# Patient Record
Sex: FEMALE
[De-identification: names, ages, dates, MRNs, and addresses within clinical notes are randomized per-mention and may not be internally consistent; named-entity substitution may affect disease eponyms.]

---

## 2018-03-14 ENCOUNTER — HOSPITAL ENCOUNTER (OUTPATIENT)
Dept: HOSPITAL 14 - H.OPSURG | Age: 53
Discharge: HOME | End: 2018-03-14
Attending: ORTHOPAEDIC SURGERY
Payer: COMMERCIAL

## 2018-03-14 VITALS
OXYGEN SATURATION: 97 % | TEMPERATURE: 98.2 F | SYSTOLIC BLOOD PRESSURE: 128 MMHG | DIASTOLIC BLOOD PRESSURE: 70 MMHG | HEART RATE: 73 BPM

## 2018-03-14 VITALS — RESPIRATION RATE: 18 BRPM

## 2018-03-14 VITALS — BODY MASS INDEX: 43.7 KG/M2

## 2018-03-14 DIAGNOSIS — G47.33: ICD-10-CM

## 2018-03-14 DIAGNOSIS — E66.01: ICD-10-CM

## 2018-03-14 DIAGNOSIS — M16.11: Primary | ICD-10-CM

## 2018-03-14 PROCEDURE — 71046 X-RAY EXAM CHEST 2 VIEWS: CPT

## 2018-03-14 PROCEDURE — 76000 FLUOROSCOPY <1 HR PHYS/QHP: CPT

## 2018-03-14 PROCEDURE — 27275 MANIPULATION OF HIP JOINT: CPT

## 2018-03-14 PROCEDURE — 20610 DRAIN/INJ JOINT/BURSA W/O US: CPT

## 2018-03-14 PROCEDURE — 93005 ELECTROCARDIOGRAM TRACING: CPT

## 2018-03-14 RX ADMIN — HYDROMORPHONE HYDROCHLORIDE PRN MG: 1 INJECTION, SOLUTION INTRAMUSCULAR; INTRAVENOUS; SUBCUTANEOUS at 13:35

## 2018-03-14 RX ADMIN — HYDROMORPHONE HYDROCHLORIDE PRN MG: 1 INJECTION, SOLUTION INTRAMUSCULAR; INTRAVENOUS; SUBCUTANEOUS at 14:00

## 2018-03-14 RX ADMIN — HYDROMORPHONE HYDROCHLORIDE PRN MG: 1 INJECTION, SOLUTION INTRAMUSCULAR; INTRAVENOUS; SUBCUTANEOUS at 13:45

## 2018-03-14 RX ADMIN — HYDROMORPHONE HYDROCHLORIDE PRN MG: 1 INJECTION, SOLUTION INTRAMUSCULAR; INTRAVENOUS; SUBCUTANEOUS at 14:15

## 2018-03-14 NOTE — RAD
HISTORY:

preop  



COMPARISON:

No prior.



TECHNIQUE:

Chest PA and lateral



FINDINGS:



LUNGS:

Minor bibasilar atelectasis



PLEURA:

No significant pleural effusion identified. No pneumothorax apparent.



CARDIOVASCULAR:

Normal.



OSSEOUS STRUCTURES:

Minor multilevel degenerative spondylosis of the thoracic spine



VISUALIZED UPPER ABDOMEN:

Normal.



OTHER FINDINGS:

None.



IMPRESSION:

Minor bibasilar atelectasis

## 2018-03-14 NOTE — RAD
PROCEDURE:  Intraoperative Fluoroscopy. 



HISTORY:

RT. HIP INJECTION



FINDINGS:

Fluoroscopic assistance was provided. 34.9 seconds fluoroscopy time 

utilized during this procedure.  Radiation dose = 5.68 mGy.



Please refer to the operative report from [facility].

## 2018-03-14 NOTE — CARD
--------------- APPROVED REPORT --------------





EKG Measurement

Heart [id number]

[id number]

[id number]

[id number]

[id number]



<Conclusion>

Normal sinus rhythm

Normal ECG

## 2018-03-14 NOTE — PCM.SURG1
Surgeon's Initial Post Op Note





- Surgeon's Notes


Surgeon: Jayshree


Assistant: MISHEL Orantes


Type of Anesthesia: General Endo


Anesthesia Administered By: Dr SILVA Vasquez


Pre-Operative Diagnosis: Severe hypertrophic O/A R hip.  Morbid obesity


Operative Findings: hypertrophic O/A R Hip


Post-Operative Diagnosis: as above.  morbid obesity


Operation Performed: Arthrogram r Hip.  intraraticular injection R hip.  

positioning of fluor/interpretation of video


Specimen/Specimens Removed: synvial fluid


Estimated Blood Loss: EBL {In ML}: 3


Blood Products Given: N/A


Drains Used: No Drains


Post-Op Condition: Good


Date of Surgery/Procedure: 03/14/18


Time of Surgery/Procedure: 13:10 (time in room/anaesthesia indcution time 12:28)

## 2018-03-14 NOTE — CP.SDSHP
Same Day Surgery H & P





- History


Proposed Procedure: Right hip intra-articular steroid injection using 

flouroscopy under anesthesia


Pre-Op Diagnosis: Right hip osteoarthritis





- Previous Medical/Surgical History


Misc: Other (Lumbar HNP and spinal stenosis, Morbid obesity)


Pain: 8.Very Severe


Previous Surgical History: ectopic pregnancy





- Allergies


Allergies: 


Allergies





No Known Allergies Allergy (Verified 03/14/18 08:21)


 











- Current Medications


Current Medications: 


see med rec








- Physical Exam


General Appearance: No acute distress


Vital Signs: 


 Vital Signs











  03/14/18 03/14/18





  09:00 09:29


 


Temperature 97.6 F 


 


Pulse Rate 90 90


 


Respiratory 18 





Rate  


 


Blood Pressure 153/72 H 


 


O2 Sat by Pulse 97 





Oximetry  











Neuro: WNL


Heart: WNL


Lungs: WNL


GI: WNL





- {Optional Preform as Required}


Abdomen: WNL


Integument: WNL


Ortho: Other (RLE: Pain with ROM of hip, gross NVI distally, motor 5/5)





- Impression


Impression: Patient is a 53 y/o female with severe right hip pain which has 

failed conservative management with NSAID's and PT over the past 3 years.  The 

pain has progressively worsened over the past few months hindering her ADL's, 

especially walking.   The patient is mobidly obese.  The decision was made to 

proceed with an intra-articular steriod injection of the hip for management.


Pt. Evaluated Today:Candidate for Anesthesia & Procedure: Yes (Risk/Varun 

discussed with pt in detail, she agrees to proceed)





- Date & Time


Date: 03/14/18


Time: 12:00





Short Stay Discharge





- Short Stay Discharge


Admitting Diagnosis/Reason for Visit: M25.551


Disposition: HOME/ ROUTINE


Referrals: 


Kingsley Martell III, MD [Primary Care Provider] -

## 2018-03-15 NOTE — OP
PROCEDURE DATE:  03/14/2018



LOCATION:  [facility].



PREOPERATIVE DIAGNOSES:

1.  Severe hypertrophic osteoarthritis of the right hip.

2.  Morbid obesity.



OPERATIVE FINDINGS:

Hypertrophic osteoarthritis of the right hip with flexion contracture and

morbid obesity.



POSTOPERATIVE DIAGNOSES:

1.  Severe hypertrophic osteoarthritis of the right hip.

2.  Morbid obesity.



SURGEON:  Kingsley Martell MD



FIRST ASSISTANT:  Effie Upton, certified registered nursing first

assistant.  Completion of the operative goal could not have been achieved

without the assistance of Effie Upton, certified registered nursing first

assistant.



ANESTHESIA:  General tracheal anesthesia.



OPERATION PERFORMED:

1.  Arthrogram right hip.

2.  Placement of the arthrography needle.

3.  Intra-articular injection of the hip.

4.  Manipulation of the right hip under anesthesia.

5.  Positioning of fluoroscope interpretation of video images.



SPECIMENS REMOVED:  Synovial fluid.



BLOOD LOSS:  Approximately 3 mL.



BLOOD PRODUCTS GIVEN:  None.



DRAINS:  No drains.



POSTOPERATIVE CONDITION:  Stable.



TIME IN THE ROOM:  12:28.



INCISION TIME:  1310 hours.



OPERATIVE INDICATIONS:  Elizabeth Butts is a 52-year-old woman who is

morbidly obese who presents with severe pain and restricted range of motion

of the hip.  The patient can no longer withstand the discomfort, total hip

replacement is imminent, but a conservative approach consisting of weight

loss and intra-articular injection first is discussed.



PROCEDURE IN DETAIL:  After having obtained informed consent, after the

satisfactory induction of general endotracheal anesthesia by Dr. Harmon, after having identified side, site, and procedure and critical

pause/time-out, the right lower extremity was prepped and free draped in

usual fashion for the intra-articular injection and arthrogram of the right

hip.  Under the surgeon's direction, the fluoroscope was positioned, video

images were generated, therapeutic decisions were made therefrom.  The

femoral artery was palpated and marked with indelible marker.  The pannus

was reflected out of the way.  The anterior superior iliac spine was

identified, and under the surgeon's direction, the fluoroscope was

positioned.  The hip joint was localized with a radiopaque instrument.  The

long spinal needle was introduced and is positioned both on AP and frog

lateral views.  Radiopaque contrast was introduced into the hip joint. 

Arthrogram having been accomplished, aspiration was accomplished.  At this

point in time, the position of the needle was found to be acceptable and a

solution of Depo-Medrol, Duramorph, and Marcaine was introduced.  Again the

hip was manipulated under anesthesia.  There was found to be evidence of a

flexion contracture with marked arthritic change and restricted motion in

essentially all planes.  Compression dressing was applied.  The patient was

transferred from the operating table to the stretcher having tolerated the

procedure well.





__________________________________________

Kingsley Martell MD





DD:  03/15/2018 15:30:51

DT:  03/15/2018 15:34:10

Job # [id number]

## 2018-05-02 ENCOUNTER — HOSPITAL ENCOUNTER (INPATIENT)
Dept: HOSPITAL 14 - H.OPSURG | Age: 53
LOS: 2 days | Discharge: NURSING, REHAB OR LONG-TERM CARE | DRG: 470 | End: 2018-05-04
Attending: ORTHOPAEDIC SURGERY | Admitting: INTERNAL MEDICINE
Payer: COMMERCIAL

## 2018-05-02 VITALS — BODY MASS INDEX: 42.9 KG/M2

## 2018-05-02 DIAGNOSIS — E66.01: ICD-10-CM

## 2018-05-02 DIAGNOSIS — N39.0: ICD-10-CM

## 2018-05-02 DIAGNOSIS — Z87.891: ICD-10-CM

## 2018-05-02 DIAGNOSIS — Z23: ICD-10-CM

## 2018-05-02 DIAGNOSIS — D62: ICD-10-CM

## 2018-05-02 DIAGNOSIS — M16.11: Primary | ICD-10-CM

## 2018-05-02 LAB
BASOPHILS # BLD AUTO: 0.1 K/UL (ref 0–0.2)
BASOPHILS NFR BLD: 1 % (ref 0–2)
EOSINOPHIL # BLD AUTO: 0.1 K/UL (ref 0–0.7)
EOSINOPHIL NFR BLD: 2.1 % (ref 0–4)
ERYTHROCYTE [DISTWIDTH] IN BLOOD BY AUTOMATED COUNT: 12.6 % (ref 11.5–14.5)
HGB BLD-MCNC: 14.6 G/DL (ref 12–16)
LYMPHOCYTES # BLD AUTO: 1.8 K/UL (ref 1–4.3)
LYMPHOCYTES NFR BLD AUTO: 29.7 % (ref 20–40)
MCH RBC QN AUTO: 32.5 PG (ref 27–31)
MCHC RBC AUTO-ENTMCNC: 34.1 G/DL (ref 33–37)
MCV RBC AUTO: 95.5 FL (ref 81–99)
MONOCYTES # BLD: 0.8 K/UL (ref 0–0.8)
MONOCYTES NFR BLD: 13.2 % (ref 0–10)
NEUTROPHILS # BLD: 3.2 K/UL (ref 1.8–7)
NEUTROPHILS NFR BLD AUTO: 54 % (ref 50–75)
NRBC BLD AUTO-RTO: 0 % (ref 0–0)
PLATELET # BLD: 259 K/UL (ref 130–400)
PMV BLD AUTO: 9 FL (ref 7.2–11.7)
RBC # BLD AUTO: 4.5 MIL/UL (ref 3.8–5.2)
WBC # BLD AUTO: 6 K/UL (ref 4.8–10.8)

## 2018-05-02 PROCEDURE — 3E0T33Z INTRODUCTION OF ANTI-INFLAMMATORY INTO PERIPHERAL NERVES AND PLEXI, PERCUTANEOUS APPROACH: ICD-10-PCS

## 2018-05-02 PROCEDURE — 0SR90JZ REPLACEMENT OF RIGHT HIP JOINT WITH SYNTHETIC SUBSTITUTE, OPEN APPROACH: ICD-10-PCS | Performed by: ORTHOPAEDIC SURGERY

## 2018-05-02 PROCEDURE — 3E0T3BZ INTRODUCTION OF ANESTHETIC AGENT INTO PERIPHERAL NERVES AND PLEXI, PERCUTANEOUS APPROACH: ICD-10-PCS | Performed by: ORTHOPAEDIC SURGERY

## 2018-05-02 RX ADMIN — HYDROMORPHONE HYDROCHLORIDE PRN MG: 1 INJECTION, SOLUTION INTRAMUSCULAR; INTRAVENOUS; SUBCUTANEOUS at 15:20

## 2018-05-02 RX ADMIN — OXYCODONE HYDROCHLORIDE AND ACETAMINOPHEN PRN TAB: 5; 325 TABLET ORAL at 18:55

## 2018-05-02 RX ADMIN — HYDROMORPHONE HYDROCHLORIDE PRN MG: 1 INJECTION, SOLUTION INTRAMUSCULAR; INTRAVENOUS; SUBCUTANEOUS at 15:05

## 2018-05-02 RX ADMIN — HYDROMORPHONE HYDROCHLORIDE PRN MG: 1 INJECTION, SOLUTION INTRAMUSCULAR; INTRAVENOUS; SUBCUTANEOUS at 14:35

## 2018-05-02 RX ADMIN — OXYCODONE HYDROCHLORIDE AND ACETAMINOPHEN PRN TAB: 5; 325 TABLET ORAL at 22:21

## 2018-05-02 RX ADMIN — HYDROMORPHONE HYDROCHLORIDE PRN MG: 1 INJECTION, SOLUTION INTRAMUSCULAR; INTRAVENOUS; SUBCUTANEOUS at 14:50

## 2018-05-02 NOTE — CP.PCM.HP
History of Present Illness





- History of Present Illness


History of Present Illness: 











Chief Complaint: Right Hip Pain





HPI:








53 y/o lady , no significant PMH except for Degenerative Joint and Disc Dis , 

came in for scheduled Right THR.  The patient states that for the past 3 years 

, she has been having right hip pain w/c progressively  worsened.  For the Past 

1 year , she has been experiencing severe pain requiring almost daily intake of 

analgesic - Ultram.  She went to see Dr Hernandez and imagings done showed Severe 

OA .  She had Intra-articular injections and Physical therapy however despite 

these her pain continued.  She was then advised surgery.











Present on Admission





- Present on Admission


Any Indicators Present on Admission: No





Review of Systems





- Review of Systems


All systems: reviewed and no additional remarkable complaints except





- Constitutional


Constitutional: absent: Chills, Fever





- EENT


Eyes: absent: Change in Vision


Ears: absent: Decreased Hearing, Ear Discharge


Nose/Mouth/Throat: absent: Epistaxis, Nasal Congestion, Nasal Discharge





- Cardiovascular


Cardiovascular: absent: Chest Pain, Claudication, Pain Radiating to Arm/Neck/Jaw

, Lightheadedness, Orthopnea, Palpitations, Pedal Edema





- Respiratory


Respiratory: absent: Cough, Dyspnea, Hemoptysis, Dyspnea on Exertion





- Gastrointestinal


Gastrointestinal: absent: Abdominal Pain, Nausea, Vomiting





- Genitourinary


Genitourinary: absent: Dysuria, Hematuria, Pyuria, Nocturia





- Musculoskeletal


Musculoskeletal: Arthralgias, Limited Range of Motion (right hip).  absent: 

Back Pain





- Integumentary


Integumentary: absent: Pruritus, Rash, Skin Ulcer, Sores





- Neurological


Neurological: absent: Dizziness, Focal Weakness, Headaches, Loss of Vision, 

Paresthesias





- Endocrine


Endocrine: absent: Polydipsia, Polyphagia, Polyuria





- Hematologic/Lymphatic


Hematologic: absent: Easy Bleeding, Easy Bruising





Past Patient History





- Infectious Disease


Hx of Infectious Diseases: None





- Tetanus Immunizations


Tetanus Immunization: Unknown





- Past Medical History & Family History


Past Medical History?: Yes


Past Family History: Reviewed and not pertinent





- Past Social History


Smoking Status: Former Smoker


Chewing Tobacco Use: No


Cigar Use: No


Occupation: works for a [employer] company


Alcohol: None


Home Situation {Lives}: With Family





- CARDIAC


Hx Cardiac Disorders: No





- PULMONARY


Hx Respiratory Disorders: No





- NEUROLOGICAL


Hx Neurological Disorder: No





- HEENT


Hx HEENT Problems: No





- RENAL


Hx Chronic Kidney Disease: No





- ENDOCRINE/METABOLIC


Hx Endocrine Disorders: No





- HEMATOLOGICAL/ONCOLOGICAL


Hx Blood Disorders: No


Hx Blood Transfusion Reaction: No





- INTEGUMENTARY


Hx Dermatological Problems: No





- MUSCULOSKELETAL/RHEUMATOLOGICAL


Hx Musculoskeletal Disorders: No


Hx Arthritis: Yes


Hx Degenerative Joint Disease: Yes


Hx Herniated Disk: Yes (4)


Hx Osteoarthritis: Yes


Hx Spinal Stenosis: Yes





- GASTROINTESTINAL


Hx Gastrointestinal Disorders: Yes


Hx Gastritis: Yes





- GENITOURINARY/GYNECOLOGICAL


Hx Genitourinary Disorders: No





- PSYCHIATRIC


Hx Psychophysiologic Disorder: No





- SURGICAL HISTORY


Hx Surgeries: Yes


Other/Comment: epidural,ectopic pregnancy





- ANESTHESIA


Hx Anesthesia: Yes


Hx Anesthesia Reactions: No


Hx Malignant Hyperthermia: No


Has any member of the family had a problem w/ anesthesia?: No





Meds


Allergies/Adverse Reactions: 


 Allergies











Allergy/AdvReac Type Severity Reaction Status Date / Time


 


No Known Allergies Allergy   Verified 05/02/18 06:44














Physical Exam





- Constitutional


Appears: Well, Non-toxic, No Acute Distress





- Head Exam


Head Exam: ATRAUMATIC, NORMAL INSPECTION, NORMOCEPHALIC





- Eye Exam


Eye Exam: EOMI, Normal appearance, PERRL


Pupil Exam: NORMAL ACCOMODATION





- ENT Exam


ENT Exam: Mucous Membranes Moist, Normal External Ear Exam





- Neck Exam


Neck exam: Positive for: Full Rom.  Negative for: Meningismus





- Respiratory Exam


Respiratory Exam: NORMAL BREATHING PATTERN.  absent: Rales, Wheezes, 

Respiratory Distress





- Cardiovascular Exam


Cardiovascular Exam: REGULAR RHYTHM, +S1, +S2





- GI/Abdominal Exam


GI & Abdominal Exam: Normal Bowel Sounds, Soft.  absent: Tenderness





- Extremities Exam


Extremities exam: Positive for: normal capillary refill, pedal pulses present.  

Negative for: calf tenderness


Additional comments: 





Pain on ROM of the right Hip





- Back Exam


Back exam: FULL ROM.  absent: CVA tenderness (L), CVA tenderness (R)





- Neurological Exam


Neurological exam: Alert, CN II-XII Intact, Oriented x3, Reflexes Normal





- Psychiatric Exam


Psychiatric exam: Normal Affect, Normal Mood





- Skin


Skin Exam: Dry, Intact, Normal Color, Warm





Results





- Vital Signs


Recent Vital Signs: 





 Last Vital Signs











Temp  98 F   05/02/18 07:00


 


Pulse  87   05/02/18 07:00


 


Resp      


 


BP  137/79   05/02/18 07:00


 


Pulse Ox  95   05/02/18 07:00














- Labs


Result Diagrams: 


 05/02/18 06:45





Labs: 





 Laboratory Results - last 24 hr











  05/02/18





  06:45


 


WBC  6.0


 


RBC  4.50


 


Hgb  14.6


 


Hct  42.9


 


MCV  95.5


 


MCH  32.5 H


 


MCHC  34.1


 


RDW  12.6


 


Plt Count  259


 


MPV  9.0


 


Neut % (Auto)  54.0


 


Lymph % (Auto)  29.7


 


Mono % (Auto)  13.2 H


 


Eos % (Auto)  2.1


 


Baso % (Auto)  1.0


 


Neut # (Auto)  3.2


 


Lymph # (Auto)  1.8


 


Mono # (Auto)  0.8


 


Eos # (Auto)  0.1


 


Baso # (Auto)  0.1














Reviewed labs done as outpt





- EKG Data


EKG Interpreted by: Myself


EKG shows normal: Sinus rhythm


Rate: Normal





Assessment & Plan


(1) Primary osteoarthritis of right hip


Status: Acute   





(2) Morbid obesity with BMI of 40.0-44.9, adult


Status: Chronic   





(3) UTI (urinary tract infection)


Status: Resolved   





(4) DVT prophylaxis


Status: Acute   





- Assessment and Plan (Free Text)


Assessment: 











53 y/o lady with hx of Degen Joint and Disc Disease , came in for scheduled 

Right THR.  Pt has Severe Primary OA of the right hip , and failed outpatient 

conservative treatment for OA.





(1) Primary osteoarthritis of right hip


Status: Acute   


Severe Right hip pain , requiring daily pain meds, pain on ROM, pain on 

ambulating worseing this past year.


Plan for Right THR


Ortho: DR Martell


Pt medically optimize for the surgery as outpt, low cardiac risk


Pain mgt


DVT proph post op


PT/OT consults








(2) Morbid obesity with BMI of 40.0-44.9, adult


Status: Chronic   





(3) UTI (urinary tract infection), treated


Status: Resolved   


pt just completed 1 wk for antibiotic treatment ( cipro) for UTI








(4) DVT prophylaxis


Status: Acute   


start Lovenox post op





Decision To Admit





- Pt Status Changed To:


Hospital Disposition Of: Inpatient





- Admit Certification


Admit to Inpatient:: After my assessment, the patient will require 

hospitalization for at least two midnights.  This is because of the severity of 

symptoms shown, intensity of services needed, and/or the medical risk in this 

patient being treated as an outpatient.





- .


Bed Request Type: Med/Surg


Admitting Physician: Flor Whitehead

## 2018-05-02 NOTE — PCM.ANESB3
Femoral Nerve Block





- Femoral Nerve Block


Date of Procedure: 05/02/18


Anesthesiologist: Faustino


Pre-Procedure Diagnosis: Right hip advanced arthritis


Post-Procedure Diagnosis: Same


Procedure Performed: Femoral Nerve Block Right





- Procedure


Femoral Nerve Block: 


The procedure was explained to the patient that it is for the post-operative 

pain management. Consent was obtained after a thorough discussion with the 

patient regarding the benefits and possible complications of local anesthetic 

block of the femoral nerve at the inguinal crease area. The patient was brought 

to the operating room and standard monitors were applied. Time-out was held 

with the circulating nurse to confirm the correct surgery and the appropriate 

block. Under general anesthesia, patient was placed in supine position with 

fully extended lower extremities and the __right_____ groin exposed. The 

femoral artery was then carefully palpated. The ultrasound transducer was then 

applied to this area in the transverse plane and the femoral nerve was 

visualized lateral to the femoral artery and underneath the fascia iliaca. 

After thorough identification, the inguinal crease area was prepped with 

Chloraprep





At this point, a #22 gauge Stimuplex 4-inch needle was inserted immediately 

lateral to the femoral artery pulse at the inguinal crease and advanced 

perpendicularly. The needle was inserted to the ultrasound transducer in-plane 

towards the femoral nerve in a lateral-to-medial direction. Needle advancement 

was performed carefully under direct ultrasound visualization. Nerve stimulator 

was used and twitch of the quadriceps muscle was obtained at current of __0.4___

MA. After negative aspiration, ___2__cc of __0.25___% ___bupivicaine____________

_____was  injected and this was followed with __38____ cc of ___0.25____ % ____

bupivicaine__________. Under ultrasound guidance the local anesthetics were 

observed spreading below fascia iliaca around the femoral nerve and laterally 

towards the lateral femoral cutaneous nerve. The needle was removed intact and 

sterile dressing was applied. 





The patient tolerated the femoral nerve block well with stable vital signs and 

was prepared for emergence.

## 2018-05-02 NOTE — PCM.SURG1
Surgeon's Initial Post Op Note





- Surgeon's Notes


Surgeon: Jayshree


Assistant: REMA Rawls CRNFA


Type of Anesthesia: General Endo, Spinal


Anesthesia Administered By: Dr Vasquez


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


Operative Findings: Severe DJD R hip.  contracture iliopsoas tendon.  synovitis 

R hip


Post-Operative Diagnosis: as above


Operation Performed: R THR- anterior approach.  femoral neck osteotomy.  

release iliopsoas.  autograft/allograft bone graft.  computer navigation


Specimen/Specimens Removed: bone,tendon,cartilage


Estimated Blood Loss: EBL {In ML}: 300


Blood Products Given: N/A


Drains Used: No Drains


Post-Op Condition: Good


Date of Surgery/Procedure: 05/02/18


Time of Surgery/Procedure: 09:05 (time in room 7:50/anesthesia induction time 7:

50)

## 2018-05-02 NOTE — CP.PCM.CON
History of Present Illness





- History of Present Illness


History of Present Illness: 





Orthopedic consultation Dr. Martell





52F with right hip DJD failed conservative mgmt and elected for THR. PMH: GERD  

NKDA


No hx stents/bleeding or clotting disorder/DVT/seizure disorder





Review of Systems





- Review of Systems


All systems: reviewed and no additional remarkable complaints except





- Musculoskeletal


Musculoskeletal: As Per HPI (no recent illness)





Past Patient History





- Past Medical History & Family History


Past Medical History?: Yes





- Past Social History


Smoking Status: Never Smoked





- CARDIAC


Hx Cardiac Disorders: No





- PULMONARY


Hx Respiratory Disorders: No





- NEUROLOGICAL


Hx Neurological Disorder: No





- HEENT


Hx HEENT Problems: No





- RENAL


Hx Chronic Kidney Disease: No





- ENDOCRINE/METABOLIC


Hx Endocrine Disorders: No





- HEMATOLOGICAL/ONCOLOGICAL


Hx Blood Disorders: No


Hx Blood Transfusion Reaction: No





- INTEGUMENTARY


Hx Dermatological Problems: No





- MUSCULOSKELETAL/RHEUMATOLOGICAL


Hx Musculoskeletal Disorders: No


Hx Herniated Disk: Yes (4)


Hx Spinal Stenosis: Yes





- GASTROINTESTINAL


Hx Gastrointestinal Disorders: Yes


Hx Gastritis: Yes





- GENITOURINARY/GYNECOLOGICAL


Hx Genitourinary Disorders: No





- PSYCHIATRIC


Hx Psychophysiologic Disorder: No





- SURGICAL HISTORY


Hx Surgeries: Yes


Other/Comment: epidural,ectopic pregnancy





- ANESTHESIA


Hx Anesthesia: Yes


Hx Anesthesia Reactions: No


Hx Malignant Hyperthermia: No


Has any member of the family had a problem w/ anesthesia?: No





Meds


Allergies/Adverse Reactions: 


 Allergies











Allergy/AdvReac Type Severity Reaction Status Date / Time


 


No Known Allergies Allergy   Verified 05/02/18 06:44














- Medications


Medications: 


 Current Medications





Tranexamic Acid 1,000 mg/ (Sodium Chloride)  100 mls @ 10 mls/min IVPB STAT STA


   Stop: 05/02/18 07:14











Physical Exam





- Constitutional


Appears: Well, No Acute Distress





- Respiratory Exam


Respiratory Exam: NORMAL BREATHING PATTERN





- Extremities Exam


Additional comments: 





calves soft NT neg homans


sensation itnact+DP/PT pulses





- Expanded Lower Extremities Exam


  ** Right


Ankle exam: FULL ROM, NORMAL INSPECTION


Neuro vacular tendon exam: no vascular compromise





- Neurological Exam


Neurological exam: Alert, Oriented x3





- Psychiatric Exam


Psychiatric exam: Normal Affect, Normal Mood





- Skin


Skin Exam: Dry, Intact, Normal Color, Warm





Results





- Vital Signs


Recent Vital Signs: 


 Last Vital Signs











Temp  98 F   05/02/18 07:00


 


Pulse  87   05/02/18 07:00


 


Resp      


 


BP  137/79   05/02/18 07:00


 


Pulse Ox  95   05/02/18 07:00














- Labs


Result Diagrams: 


 05/02/18 06:45





Labs: 


 Laboratory Results - last 24 hr











  05/02/18 05/02/18





  06:45 06:45


 


WBC  6.0 


 


RBC  4.50 


 


Hgb  14.6 


 


Hct  42.9 


 


MCV  95.5 


 


MCH  32.5 H 


 


MCHC  34.1 


 


RDW  12.6 


 


Plt Count  259 


 


MPV  9.0 


 


Neut % (Auto)  54.0 


 


Lymph % (Auto)  29.7 


 


Mono % (Auto)  13.2 H 


 


Eos % (Auto)  2.1 


 


Baso % (Auto)  1.0 


 


Neut # (Auto)  3.2 


 


Lymph # (Auto)  1.8 


 


Mono # (Auto)  0.8 


 


Eos # (Auto)  0.1 


 


Baso # (Auto)  0.1 


 


Crossmatch   See Detail


 


BBK History Checked   No verified bt














Assessment & Plan


(1) Primary osteoarthritis of right hip


Assessment and Plan: 


NPO


T&S


risks/benefits/alt of THR explained to patient who verbalized understanding and 

consented to procedure


d/w Dr. Martell, agrees with above


Status: Acute   





(2) Morbid obesity with BMI of 40.0-44.9, adult


Status: Chronic

## 2018-05-02 NOTE — RAD
PROCEDURE:  Right Hip Radiographs.



AP portable views of the pelvis and right hip performed. Study is 

somewhat limited due to portable technique 



HISTORY:

Status post right BETTY  



COMPARISON:

None.



FINDINGS:



BONES:

Patient is status post right total hip arthroplasty. Right femoral 

component appears properly located within the acetabular component. 



JOINTS:

As above. 



SOFT TISSUES:

Normal. 



OTHER FINDINGS:

None.



IMPRESSION:

Limited portable views of the pelvis and hips demonstrate right-sided 

total hip replacement. .  The femoral head component appears 

appropriately located within the acetabular component

## 2018-05-02 NOTE — RAD
PROCEDURE:  Intraoperative Fluoroscopy. 



HISTORY:

TOTAL HIP



FINDINGS:

Fluoroscopic assistance was provided.Radiation dose = 9.78 mGy during 

51 seconds fluoroscopy time.  



Please refer to the operative report from [facility].

## 2018-05-03 LAB
BUN SERPL-MCNC: 17 MG/DL (ref 7–17)
CALCIUM SERPL-MCNC: 8.4 MG/DL (ref 8.4–10.2)
ERYTHROCYTE [DISTWIDTH] IN BLOOD BY AUTOMATED COUNT: 12.8 % (ref 11.5–14.5)
GFR NON-AFRICAN AMERICAN: > 60
HGB BLD-MCNC: 9.8 G/DL (ref 12–16)
MCH RBC QN AUTO: 32.8 PG (ref 27–31)
MCHC RBC AUTO-ENTMCNC: 33.8 G/DL (ref 33–37)
MCV RBC AUTO: 97.2 FL (ref 81–99)
PLATELET # BLD: 195 K/UL (ref 130–400)
RBC # BLD AUTO: 2.97 MIL/UL (ref 3.8–5.2)
WBC # BLD AUTO: 9.8 K/UL (ref 4.8–10.8)

## 2018-05-03 PROCEDURE — 3E0234Z INTRODUCTION OF SERUM, TOXOID AND VACCINE INTO MUSCLE, PERCUTANEOUS APPROACH: ICD-10-PCS | Performed by: INTERNAL MEDICINE

## 2018-05-03 RX ADMIN — OXYCODONE HYDROCHLORIDE SCH MG: 10 TABLET, FILM COATED, EXTENDED RELEASE ORAL at 09:32

## 2018-05-03 RX ADMIN — OXYCODONE HYDROCHLORIDE AND ACETAMINOPHEN PRN TAB: 5; 325 TABLET ORAL at 02:42

## 2018-05-03 RX ADMIN — POLYVINYL ALCOHOL PRN DROP: 14 SOLUTION/ DROPS OPHTHALMIC at 11:20

## 2018-05-03 RX ADMIN — OXYCODONE HYDROCHLORIDE PRN MG: 10 TABLET ORAL at 11:42

## 2018-05-03 RX ADMIN — OXYCODONE HYDROCHLORIDE AND ACETAMINOPHEN PRN TAB: 5; 325 TABLET ORAL at 06:56

## 2018-05-03 RX ADMIN — OXYCODONE HYDROCHLORIDE SCH MG: 10 TABLET, FILM COATED, EXTENDED RELEASE ORAL at 20:53

## 2018-05-03 RX ADMIN — POLYVINYL ALCOHOL PRN DROP: 14 SOLUTION/ DROPS OPHTHALMIC at 20:56

## 2018-05-03 RX ADMIN — OXYCODONE HYDROCHLORIDE PRN MG: 10 TABLET ORAL at 16:36

## 2018-05-03 RX ADMIN — ENOXAPARIN SODIUM SCH MG: 40 INJECTION SUBCUTANEOUS at 09:31

## 2018-05-03 NOTE — OP
PROCEDURE DATE:  05/02/2018



PREOPERATIVE DIAGNOSES:  Severe osteoarthritis of the right hip with

acetabular deformity.



POSTOPERATIVE DIAGNOSES:  Severe osteoarthritis of the right hip with

acetabular deformity.



PROCEDURE PERFORMED:

1.  Right total hip replacement arthroplasty.

2.  Femoral neck osteotomy.

3.  Release of iliopsoas tendon.

4.  Autograft, allograft and bone graft to the acetabulum.

5.  Computer navigation.



SURGEON:  Kingsley Martell MD



FIRST ASSISTANT:  Effie Upton, certified registered nursing first

assistant.



SECOND ASSISTANT:  Bird Zamorano PA-C.



SPECIMENS REMOVED:  Bone tendon and cartilage.



ESTIMATED BLOOD LOSS:  Approximately 300 mL.



BLOOD PRODUCTS:  No blood products given.



DRAINS:  One ISAI drain.



POSTOPERATIVE CONDITION:  Good.



DATE OF SURGERY:  05/02/2018



TIME SEEN:  Time in the room 7:50 and anesthesia incision time 9:05.



OPERATIVE INDICATION:  Elizabeth Butts is a 52-year-old woman who has severe

pain and restricted range of motion of the right hip.  The patient has

failed intra-articular injection.  The patient now presents for definitive

replacement arthroplasty.  Pros, cons, risks and benefits of anterior and

posterior approach of hip replacement are discussed.  The patient's BMI is

42, she is on the baseline, but she and her , Juan are very

interested in the approach anteriorly and we will accomplish the same. 

Possibility of mechanical failure, infection, component malposition,

thromboembolic disease, nerve injury, leg-length inequality, secondary or

tertiary surgery was discussed.  It should be noted that the patient had

initially a preoperative leg-length inequality, right worse than left to

approximately 1 inch right shorter than the left.  Again the possibility of

mechanical failure, infection, thromboembolic disease, secondary or

tertiary surgery had been discussed.



OPERATIVE PROCEDURE:  After having obtained informed consent, after the

satisfactory induction of spinal and general anesthesia by Dr. Harmon,

after having identified the side, site and procedure and a critical

pause/time-out, the patient identified as Elizabeth Butts in the supine

position.  The patient was placed in the AMIS traction.  The pannus was

reflected, great care was taken and all bony prominences were well padded. 

After the satisfactory induction of the spinal and general anesthesia and

after having obtained informed consent under the surgeon's direction, the

fluoroscope was positioned, video images were generated, therapeutic

decisions were made therefrom.



Again after having positioned the patient in the AMIS traction positioner

with all bony prominences well padded and under the surgeon's direction,

the fluoroscope was positioned, video images were generated, and

therapeutic decisions were made therefrom.  This having been accomplished,

verification of position was offered on image intensification views.  This

having been accomplished, after sterilely prepping and draping, an incision

was described one fingerbreadth 1 cm distal to the ASIS and 3 cm

posteriorly.  The incision was described superficial to the tensor fascia

femoris muscle.  The skin incision was carried down through the skin and

subcutaneous tissue.  The fascia on the tensor fascia femoris muscle was

divided.  The tensor was taken down from the investing fascia.  The Medacta

modified, Adson-Marshal retractor was placed and the posterior aspect of

the rectus femoris was identified.  Hemostasis was controlled with the

Aquamantys.  This having been accomplished, deep to the posterior aspect of

the rectus was developed and the Adson-Marshal was placed horizontal into

superficial to the hip joint and the fascia was divided.  At this point in

time, the patient was very well muscled side from her very high 44 BMI. 

With internal rotation of the hip, the fat pads superficial to the rectus

femoris was excised and the rectus femoris was released and that having

been accomplished, the capsulotomy was accomplished extending laterally

with the hip in internal rotation.  The Cobra retractor was placed and the

capsulotomy begins, it was carried distal to the area of the

intertrochanteric tubercle, this was elevated and the capsular flap was

tagged.  Hemostasis was controlled.  This having been accomplished with

rotation, the femoral neck osteotomy was accomplished at the saddle and

because of the leg length inequality, the cut was very well planned.  The

femoral neck osteotomy was critical in this case and extra planning was

accomplished both preoperatively and intraoperatively.  Computer navigation

was at this point in time employed.  This having been accomplished,

computer navigation with accelerometer technology was employed.  Femoral

neck osteotomy was accomplished.  Femoral neck osteotomy was completed and

at this point in time, the corkscrew was placed into the neck with 45

degrees of external rotation of the femur and the head was removed from the

acetabulum.  This having been accomplished, the wound was thoroughly

irrigated.  The Medacta Hohmann retractors were placed and preparation was

made for the acetabulum, reflected head of rectus femoris having been

released, and the labrum was excised.  The pulvinar was controlled with the

Aquamantys for hemostasis.  This having been accomplished, the pulvinar was

excised and retractors were placed.  The patient does have some anterior

wall deficiency, this acetabular deformity was noted.  This having been

accomplished, the reaming was carried out to 54 mm with the hip in

abduction and appropriate anteversion.  A bit more abduction was employed

because of the patient's girth.  This having been accomplished, the

acetabulum was prepared for the 54 dual mobility cup, the dual mobility was

not satisfactory, again because the anterior acetabular wall deficiency. 

At this point in time, the reaming commences for 56, a 56 mm cup, after

extensive preparation approximately 45 minutes of acetabular preparation

was accomplished.  Autograft and bone grafting was accomplished with the

graft denuded of articular cartilage, the autograft was employed and the

acetabulum was packed, the cup was introduced.  At this point in time, two

additional fixation screws were employed, drilling was accomplished with

the flexible drill bit, followed by sounding with the depth gauge and the

appropriate size screws were placed both anteriorly and superiorly.  The

fixation was found to be excellent, cup was found to be stable.  At this

point in time, the hooded acetabular polyethylene was introduced and this

was found to be excellent.  The cup was found to be stable and the position

was found to be with the addition of the 15-mm lipped polyethylene was

found to be within the safe zone.  This having been accomplished, it should

be noted that the two incisions were made in the iliac crest, pins were

used to support the optical camera for the optical based accelerometer

computer navigation.  This having been accomplished, the cup was positioned

and verification was accomplished.  The plane of the pelvis was registered,

the ASIS both on the left and the right was found and registered and this

having been accomplished, the position was found to be acceptable. 

Navigation having been accomplished, screws were removed, the camera was

removed and at this point in time, the femur was developed, external

rotation of the femur was employed.  The pubofemoral ligament was released,

the ischiofemoral ligament, the piriformis fossa was carefully developed

and the iliofemoral ligament was released as well from the border of the

trochanter to 1 cm posterior to the acetabulum.  Hemostasis again was

controlled with the Aquamantys.  The femur was mobilized with external

rotation, hyperflexion of the femur was carried out, the bridge of bone

between the neck and the trochanter was removed, the box chisel was removed

and the canal was found.  The canal was carefully sound and this was found

to be extremely tight champagne type, [place] type A bone.  This having been

accomplished, the broaching was accomplished to a number 1 femoral

component.  Trialing was accomplished with a 3.5-mm 32 mm head.  The hip

was reduced and found to be stable in all planes.  At this point in time,

the broach was removed, the appropriate size femoral stem was introduced

+3.5 neck, 32 mm head, the hip was reduced and found to be stable in all

planes.  The wound was thoroughly irrigated.  The hip having found to be

stable, it should be noted that prior to reduction of the hip, autograft,

bone grafting was accomplished of the proximal femur as well with the

reamings from the acetabulum which were denuded of articular cartilage. 

The hip was reduced and found to be stable in all planes.  The hip was

stable in flexion, internal and external rotation.  There was no push-pull

and leg length was found to be essentially equal.  Closure was after

hemostasis controlled.  Thrombin and Gelfoam was employed.  It should be

noted that tranexamic acid was employed 2 gm and closure of the fascia with

Quill followed by Vicryl, followed by staples for skin.  The ISAI dressing

and drain was employed.  Compression with Ace bandage was employed. 

Verification of position again was offered on image intensification views

in various planes, position was found to be acceptable.  Compression

dressing having been applied, ISAI drain having been applied and ISAI

dressing having been applied.



First assistant, Effie Upton and second assistant Bird Zamorano, it should

be noted that the operative goal could not have been completed without the

assistance of these assistants.





__________________________________________

Kingsley Martell MD







DD:  05/03/2018 8:01:08

DT:  05/03/2018 8:12:03

Job # [id number]

## 2018-05-03 NOTE — CP.PCM.PN
Subjective





- Date & Time of Evaluation


Date of Evaluation: 05/03/18


Time of Evaluation: 07:45





- Subjective


Subjective: 


Patient seen and examined at bedside. Pain is moderate this AM and notes that 

the PCA does not help, percocet help but is temporary. Tolerating diet. No 

acute events overnight.








Objective





- Vital Signs/Intake and Output


Vital Signs (last 24 hours): 


 











Temp Pulse Resp BP Pulse Ox


 


 98.1 F   71   20   102/59 L  98 


 


 05/03/18 08:21  05/03/18 08:21  05/03/18 08:21  05/03/18 08:21  05/03/18 08:21











- Medications


Medications: 


 Current Medications





Acetaminophen (Tylenol 325mg Tab)  650 mg PO Q4 PRN


   PRN Reason: Fever 101 degrees fahrenheit 


Docusate Sodium (Colace)  100 mg PO BID [facility]


Enoxaparin Sodium (Lovenox)  40 mg SC DAILY [facility]


   PRN Reason: Protocol


Hydromorphone HCl (Dilaudid)  0.5 mg IVP Q4 PRN


   PRN Reason: Pain, severe (8-10)


Lactated Ringer's (Lactated Ringer's)  1,000 mls @ 100 mls/hr IV .Q10H [facility]


   Last Admin: 05/02/18 23:30 Dose:  Not Given


Lactated Ringer's (Lactated Ringer's)  1,000 mls @ 100 mls/hr IV .Q10H [facility]


   Last Admin: 05/03/18 01:13 Dose:  100 mls/hr


Cefazolin Sodium 2 gm/ Sodium (Chloride)  100 mls @ 100 mls/hr IVPB Q8 [facility]


   PRN Reason: Protocol


   Stop: 05/03/18 09:59


   Last Admin: 05/03/18 01:10 Dose:  100 mls/hr


Iron Sucrose 100 mg/ Sodium (Chloride)  105 mls @ 105 mls/hr IVPB DAILY [facility]


   Stop: 05/05/18 09:59


Ondansetron HCl (Zofran Inj)  4 mg IVP ONCE PRN


   PRN Reason: Nausea/Vomiting


   Last Admin: 05/02/18 21:12 Dose:  4 mg


Oxycodone HCl (Oxycontin Extended Release Tab)  10 mg PO Q12 [facility]


   Stop: 05/06/18 09:01


Oxycodone/Acetaminophen (Percocet 5/325 Mg Tab)  1 tab PO Q4 PRN


   PRN Reason: Pain, moderate (4-7)


   Stop: 05/05/18 17:45


   Last Admin: 05/03/18 06:56 Dose:  1 tab


Pneumococcal Polyvalent Vaccine (Pneumovax 23 Vaccine)  0.5 ml IM .ONCE ONE


   Stop: 05/03/18 09:01











- Labs


Labs: 


 





 05/03/18 05:55 





 05/03/18 05:55 











- Extremities Exam


Additional comments: 


R hip: ISAI dressing CDI, ABD pillow in place, mild swelling, mod tenderness


sensation intact SP/DP/TN


motor intact EHL/FHL/TA/G


pedal pulses intact 


comp soft/NT








Assessment and Plan


(1) Primary osteoarthritis of right hip


Assessment & Plan: 


POD#1 s/p R BETTY anterior approach


-Pain: will d/c PCA, add oxycontin and tylenol to regimen


-DVT ppx


-PT/OT WBAT


-complete postop abx doses


-maintain ABD while in bed


-d/c planning


-above d/w Dr. Martell in agreement


Status: Acute

## 2018-05-03 NOTE — CP.PCM.PN
Subjective





- Date & Time of Evaluation


Date of Evaluation: 05/03/18


Time of Evaluation: 11:00





- Subjective


Subjective: 





no fever


Pain controlled


Has ISAI drain in place


sl itch left eye


no denies CP


no SOB


no abd pain








Objective





- Vital Signs/Intake and Output


Vital Signs (last 24 hours): 


 











Temp Pulse Resp BP Pulse Ox


 


 98.1 F   71   20   102/59 L  98 


 


 05/03/18 08:21  05/03/18 08:21  05/03/18 08:21  05/03/18 08:21  05/03/18 08:21











- Medications


Medications: 


 Current Medications





Acetaminophen (Tylenol 325mg Tab)  650 mg PO Q4 PRN


   PRN Reason: Fever 101 degrees fahrenheit 


Acetaminophen (Tylenol 325mg Tab)  975 mg PO Q6 [facility]


   Stop: 05/04/18 10:01


Docusate Sodium (Colace)  100 mg PO BID [facility]


   Last Admin: 05/03/18 09:31 Dose:  100 mg


Enoxaparin Sodium (Lovenox)  40 mg SC DAILY [facility]


   PRN Reason: Protocol


   Last Admin: 05/03/18 09:31 Dose:  40 mg


Lactated Ringer's (Lactated Ringer's)  1,000 mls @ 100 mls/hr IV .Q10H [facility]


   Last Admin: 05/02/18 23:30 Dose:  Not Given


Lactated Ringer's (Lactated Ringer's)  1,000 mls @ 100 mls/hr IV .Q10H [facility]


   Last Admin: 05/03/18 01:13 Dose:  100 mls/hr


Iron Sucrose 100 mg/ Sodium (Chloride)  105 mls @ 105 mls/hr IVPB DAILY [facility]


   Stop: 05/05/18 09:59


   Last Admin: 05/03/18 09:33 Dose:  105 mls/hr


Ondansetron HCl (Zofran Inj)  4 mg IVP ONCE PRN


   PRN Reason: Nausea/Vomiting


   Last Admin: 05/02/18 21:12 Dose:  4 mg


Oxycodone HCl (Oxycontin Extended Release Tab)  10 mg PO Q12 [facility]


   Stop: 05/06/18 09:01


   Last Admin: 05/03/18 09:32 Dose:  10 mg


Oxycodone HCl (Oxycodone Immediate Release Tab)  5 mg PO Q4 PRN


   PRN Reason: Pain, moderate (4-7)


Oxycodone HCl (Oxycodone Immediate Release Tab)  10 mg PO Q4 PRN


   PRN Reason: Pain, severe (8-10)











- Labs


Labs: 


 





 05/03/18 05:55 





 05/03/18 05:55 











- Constitutional


Appears: Non-toxic, No Acute Distress





- Head Exam


Head Exam: ATRAUMATIC, NORMAL INSPECTION, NORMOCEPHALIC





- Eye Exam


Eye Exam: EOMI, Normal appearance, PERRL


Pupil Exam: NORMAL ACCOMODATION





- ENT Exam


ENT Exam: Mucous Membranes Moist, Normal External Ear Exam





- Neck Exam


Neck Exam: Full ROM.  absent: Meningismus





- Respiratory Exam


Respiratory Exam: NORMAL BREATHING PATTERN.  absent: Respiratory Distress





- Cardiovascular Exam


Cardiovascular Exam: REGULAR RHYTHM, +S1, +S2





- GI/Abdominal Exam


GI & Abdominal Exam: Soft, Normal Bowel Sounds.  absent: Tenderness





- Extremities Exam


Extremities Exam: Normal Capillary Refill.  absent: Calf Tenderness


Additional comments: 





right Hip with dressing and ISAI drain





- Back Exam


Back Exam: Full ROM.  absent: CVA tenderness (L), CVA tenderness (R)





- Neurological Exam


Neurological Exam: Alert, Awake, CN II-XII Intact, Normal Gait, Oriented x3


Neuro motor strength exam: Left Upper Extremity: 5, Right Upper Extremity: 5, 

Left Lower Extremity: 5, Right Lower Extremity: 5





- Psychiatric Exam


Psychiatric exam: Normal Affect, Normal Mood





- Skin


Skin Exam: Dry, Normal Color, Warm





Assessment and Plan


(1) Primary osteoarthritis of right hip


Status: Acute   





(2) Morbid obesity with BMI of 40.0-44.9, adult


Status: Chronic   





(3) UTI (urinary tract infection)


Status: Resolved   





(4) DVT prophylaxis


Status: Acute   





- Assessment and Plan (Free Text)


Assessment: 





51 y/o lady with hx of Degen Joint and Disc Disease , came in for scheduled 

Right THR.  Pt has Severe Primary OA of the right hip , and failed outpatient 

conservative treatment for OA.





(1) Primary osteoarthritis of right hip s/p THR


Status: Acute   


Severe Right hip pain , requiring daily pain meds, pain on ROM, pain on 

ambulating worsening this past year.


Post op Day 1 Right THR


Ortho: DR Jayshree Elaine mgt- d/c PCA - now on Oxycontin and  Percocet


DVT proph post op


PT/OT consult








(2) Morbid obesity with BMI of 40.0-44.9, adult


Status: Chronic   





(3) UTI (urinary tract infection), treated


Status: Resolved   


 completed 1 wk  antibiotic treatment ( cipro) for UTI








(4) DVT prophylaxis


Status: Acute   


Lovenox

## 2018-05-04 ENCOUNTER — HOSPITAL ENCOUNTER (INPATIENT)
Dept: HOSPITAL 14 - H.TCU | Age: 53
LOS: 7 days | Discharge: HOME | DRG: 560 | End: 2018-05-11
Attending: HOSPITALIST | Admitting: HOSPITALIST
Payer: COMMERCIAL

## 2018-05-04 VITALS — DIASTOLIC BLOOD PRESSURE: 76 MMHG | SYSTOLIC BLOOD PRESSURE: 135 MMHG | HEART RATE: 110 BPM | RESPIRATION RATE: 19 BRPM

## 2018-05-04 VITALS — BODY MASS INDEX: 42.9 KG/M2

## 2018-05-04 VITALS — OXYGEN SATURATION: 95 %

## 2018-05-04 VITALS — TEMPERATURE: 99.2 F

## 2018-05-04 DIAGNOSIS — Z87.891: ICD-10-CM

## 2018-05-04 DIAGNOSIS — K29.70: ICD-10-CM

## 2018-05-04 DIAGNOSIS — Z47.1: Primary | ICD-10-CM

## 2018-05-04 DIAGNOSIS — Z87.440: ICD-10-CM

## 2018-05-04 DIAGNOSIS — Z96.641: ICD-10-CM

## 2018-05-04 DIAGNOSIS — E66.01: ICD-10-CM

## 2018-05-04 DIAGNOSIS — M16.11: ICD-10-CM

## 2018-05-04 LAB
BUN SERPL-MCNC: 11 MG/DL (ref 7–17)
CALCIUM SERPL-MCNC: 7.9 MG/DL (ref 8.4–10.2)
ERYTHROCYTE [DISTWIDTH] IN BLOOD BY AUTOMATED COUNT: 13.2 % (ref 11.5–14.5)
GFR NON-AFRICAN AMERICAN: > 60
HGB BLD-MCNC: 8.9 G/DL (ref 12–16)
MCH RBC QN AUTO: 33.1 PG (ref 27–31)
MCHC RBC AUTO-ENTMCNC: 33.8 G/DL (ref 33–37)
MCV RBC AUTO: 98.1 FL (ref 81–99)
PLATELET # BLD: 177 K/UL (ref 130–400)
RBC # BLD AUTO: 2.69 MIL/UL (ref 3.8–5.2)
WBC # BLD AUTO: 10.5 K/UL (ref 4.8–10.8)

## 2018-05-04 PROCEDURE — F08Z4FZ HOME MANAGEMENT TREATMENT USING ASSISTIVE, ADAPTIVE, SUPPORTIVE OR PROTECTIVE EQUIPMENT: ICD-10-PCS

## 2018-05-04 PROCEDURE — F07Z9FZ GAIT TRAINING/FUNCTIONAL AMBULATION TREATMENT USING ASSISTIVE, ADAPTIVE, SUPPORTIVE OR PROTECTIVE EQUIPMENT: ICD-10-PCS

## 2018-05-04 PROCEDURE — F07L6FZ THERAPEUTIC EXERCISE TREATMENT OF MUSCULOSKELETAL SYSTEM - LOWER BACK / LOWER EXTREMITY USING ASSISTIVE, ADAPTIVE, SUPPORTIVE OR PROTECTIVE EQUIPMENT: ICD-10-PCS

## 2018-05-04 RX ADMIN — Medication PRN MG: at 19:37

## 2018-05-04 RX ADMIN — Medication PRN MG: at 23:32

## 2018-05-04 RX ADMIN — ENOXAPARIN SODIUM SCH MG: 40 INJECTION SUBCUTANEOUS at 08:49

## 2018-05-04 RX ADMIN — OXYCODONE HYDROCHLORIDE SCH: 10 TABLET, FILM COATED, EXTENDED RELEASE ORAL at 09:18

## 2018-05-04 RX ADMIN — OXYCODONE HYDROCHLORIDE PRN MG: 10 TABLET ORAL at 06:15

## 2018-05-04 RX ADMIN — OXYCODONE HYDROCHLORIDE SCH MG: 10 TABLET, FILM COATED, EXTENDED RELEASE ORAL at 08:54

## 2018-05-04 RX ADMIN — OXYCODONE HYDROCHLORIDE PRN MG: 10 TABLET ORAL at 02:48

## 2018-05-04 NOTE — CP.PCM.PN
Subjective





- Date & Time of Evaluation


Date of Evaluation: 05/04/18


Time of Evaluation: 09:57





- Subjective


Subjective: 





Patient lethargic, able to be aroused, but is very drowsy and falls right back 

asleep. No new complaints. 





Objective





- Vital Signs/Intake and Output


Vital Signs (last 24 hours): 


 











Temp Pulse Resp BP Pulse Ox


 


 99.3 F   92 H  18   112/69   97 


 


 05/04/18 07:57  05/04/18 07:57  05/04/18 07:57  05/04/18 07:57  05/04/18 07:57











- Medications


Medications: 


 Current Medications





Acetaminophen (Tylenol 325mg Tab)  650 mg PO Q4 PRN


   PRN Reason: Fever 101 degrees fahrenheit 


   Last Admin: 05/04/18 00:04 Dose:  650 mg


Acetaminophen (Tylenol 325mg Tab)  975 mg PO Q6 [facility]


   Stop: 05/04/18 10:01


   Last Admin: 05/04/18 09:37 Dose:  975 mg


Artificial Tears (Artificial Tears)  2 drop OU Q4 PRN


   PRN Reason: Dry eyes


   Last Admin: 05/03/18 20:56 Dose:  2 drop


Docusate Sodium (Colace)  100 mg PO BID [facility]


   Last Admin: 05/04/18 08:49 Dose:  100 mg


Enoxaparin Sodium (Lovenox)  40 mg SC DAILY [facility]


   PRN Reason: Protocol


   Last Admin: 05/04/18 08:49 Dose:  40 mg


Lactated Ringer's (Lactated Ringer's)  1,000 mls @ 100 mls/hr IV .Q10H [facility]


   Last Admin: 05/02/18 23:30 Dose:  Not Given


Lactated Ringer's (Lactated Ringer's)  1,000 mls @ 100 mls/hr IV .Q10H [facility]


   Last Admin: 05/03/18 01:13 Dose:  100 mls/hr


Iron Sucrose 100 mg/ Sodium (Chloride)  105 mls @ 105 mls/hr IVPB DAILY [facility]


   Stop: 05/05/18 09:59


   Last Admin: 05/04/18 08:48 Dose:  105 mls/hr


Ketorolac Tromethamine (Toradol)  30 mg IVP Q6 PRN


   PRN Reason: Pain, moderate (4-7)


Ondansetron HCl (Zofran Inj)  4 mg IVP ONCE PRN


   PRN Reason: Nausea/Vomiting


   Last Admin: 05/02/18 21:12 Dose:  4 mg


Oxycodone HCl (Oxycodone Immediate Release Tab)  5 mg PO Q4 PRN


   PRN Reason: Pain, moderate (4-7)











- Labs


Labs: 


 





 05/04/18 08:54 





 05/04/18 08:54 











- Extremities Exam


Additional comments: 





right hip: dressing intact, scant sang drainage on dressing, under suction, +

ROM ankle/toes, sensation intact c silverman soft NT neg homans





Assessment and Plan


(1) Primary osteoarthritis of right hip


Assessment & Plan: 


POD#2 s/p right THR


patient sedated, likely from pain medication. last oxycontin last night, and 

10mg oxycodone this am  6am. D/c long acting oxycontin, d/c 10mg oxycodone at 

this time. Will continue with 5mg oxycodone, continue around the clock tylenol, 

and add toradol IV prn moderate pain and continue to monitor. 





cont VTE proph, cont PT/OT


ortho stable


If patient for transfer to rehab, ISAI dressing can stay intact until POD#7 and 

then change to dry sterile dressing. 


tmax 102 over night, encourage IS and out of bed


d/w Dr. Martell, agrees with above


Status: Acute   





(2) Morbid obesity with BMI of 40.0-44.9, adult


Status: Chronic

## 2018-05-04 NOTE — CP.PCM.PN
Subjective





- Date & Time of Evaluation


Date of Evaluation: 05/04/18


Time of Evaluation: 08:17





- Subjective


Subjective: 





pt comfortable


doing well


pain controlled


HD stable


NAD





Objective





- Vital Signs/Intake and Output


Vital Signs (last 24 hours): 


 











Temp Pulse Resp BP Pulse Ox


 


 99.3 F   92 H  18   112/69   97 


 


 05/04/18 07:57  05/04/18 07:57  05/04/18 07:57  05/04/18 07:57  05/04/18 07:57











- Medications


Medications: 


 Current Medications





Acetaminophen (Tylenol 325mg Tab)  650 mg PO Q4 PRN


   PRN Reason: Fever 101 degrees fahrenheit 


   Last Admin: 05/04/18 00:04 Dose:  650 mg


Acetaminophen (Tylenol 325mg Tab)  975 mg PO Q6 [facility]


   Stop: 05/04/18 10:01


   Last Admin: 05/04/18 05:00 Dose:  975 mg


Artificial Tears (Artificial Tears)  2 drop OU Q4 PRN


   PRN Reason: Dry eyes


   Last Admin: 05/03/18 20:56 Dose:  2 drop


Docusate Sodium (Colace)  100 mg PO BID [facility]


   Last Admin: 05/03/18 18:34 Dose:  100 mg


Enoxaparin Sodium (Lovenox)  40 mg SC DAILY [facility]


   PRN Reason: Protocol


   Last Admin: 05/03/18 09:31 Dose:  40 mg


Lactated Ringer's (Lactated Ringer's)  1,000 mls @ 100 mls/hr IV .Q10H [facility]


   Last Admin: 05/02/18 23:30 Dose:  Not Given


Lactated Ringer's (Lactated Ringer's)  1,000 mls @ 100 mls/hr IV .Q10H [facility]


   Last Admin: 05/03/18 01:13 Dose:  100 mls/hr


Iron Sucrose 100 mg/ Sodium (Chloride)  105 mls @ 105 mls/hr IVPB DAILY [facility]


   Stop: 05/05/18 09:59


   Last Admin: 05/03/18 09:33 Dose:  105 mls/hr


Ondansetron HCl (Zofran Inj)  4 mg IVP ONCE PRN


   PRN Reason: Nausea/Vomiting


   Last Admin: 05/02/18 21:12 Dose:  4 mg


Oxycodone HCl (Oxycontin Extended Release Tab)  10 mg PO Q12 [facility]


   Stop: 05/06/18 09:01


   Last Admin: 05/03/18 20:53 Dose:  10 mg


Oxycodone HCl (Oxycodone Immediate Release Tab)  5 mg PO Q4 PRN


   PRN Reason: Pain, moderate (4-7)


Oxycodone HCl (Oxycodone Immediate Release Tab)  10 mg PO Q4 PRN


   PRN Reason: Pain, severe (8-10)


   Last Admin: 05/04/18 06:15 Dose:  10 mg











- Labs


Labs: 


 





 05/03/18 05:55 





 05/03/18 05:55 











- Constitutional


Appears: Non-toxic, No Acute Distress





- Head Exam


Head Exam: ATRAUMATIC, NORMOCEPHALIC





- Eye Exam


Eye Exam: EOMI, Normal appearance, PERRL





- ENT Exam


ENT Exam: Mucous Membranes Moist, Normal Oropharynx





- Respiratory Exam


Respiratory Exam: Clear to Ausculation Bilateral, NORMAL BREATHING PATTERN





- Cardiovascular Exam


Cardiovascular Exam: RRR, +S1, +S2





- GI/Abdominal Exam


GI & Abdominal Exam: Soft, Normal Bowel Sounds





- Extremities Exam


Extremities Exam: Normal Capillary Refill.  absent: Full ROM





- Back Exam


Back Exam: absent: CVA tenderness (L), CVA tenderness (R)





- Neurological Exam


Neurological Exam: Alert, Awake





- Psychiatric Exam


Psychiatric exam: Normal Affect, Normal Mood





- Skin


Skin Exam: Dry, Warm





Assessment and Plan





- Assessment and Plan (Free Text)


Plan: 








51 y/o lady with hx of Degen Joint and Disc Disease , came in for scheduled 

Right THR.  Pt has Severe Primary OA of the right hip , and failed outpatient 

conservative treatment for OA.





(1) Primary osteoarthritis of right hip s/p THR


Status: Acute   


Severe Right hip pain , requiring daily pain meds, pain on ROM, pain on 

ambulating worsening this past year.


Post op Day 2 Right THR


Ortho: DR Martell


Pain mgt- d/c PCA - now on Oxycontin and Percocet


DVT proph post op


PT/OT consult








(2) Morbid obesity with BMI of 40.0-44.9, adult


Status: Chronic   





(3) UTI (urinary tract infection), treated


Status: Resolved   


 completed 1 wk  antibiotic treatment ( cipro) for UTI








(4) Mild acute blood loss anemia


- started venofer yesterday








DVT prophylaxis


Status: Acute   


Lovenox

## 2018-05-04 NOTE — CP.PCM.DIS
Provider





- Provider


Date of Admission: 


05/02/18 13:42





Attending physician: 


Kingsley Martell III, MD





Primary care physician: 


Kingsley Martell III, MD





Time Spent in preparation of Discharge (in minutes): 30





Diagnosis





- Discharge Diagnosis


(1) DVT prophylaxis


Status: Acute   





(2) Primary osteoarthritis of right hip


Status: Acute   





(3) Morbid obesity with BMI of 40.0-44.9, adult


Status: Chronic   





(4) UTI (urinary tract infection)


Status: Resolved   





Hospital Course





- Lab Results


Lab Results: 


 Most Recent Lab Values











WBC  10.5 K/uL (4.8-10.8)   05/04/18  08:54    


 


RBC  2.69 Mil/uL (3.80-5.20)  L  05/04/18  08:54    


 


Hgb  8.9 g/dL (12.0-16.0)  L  05/04/18  08:54    


 


Hct  26.4 % (34.0-47.0)  L  05/04/18  08:54    


 


MCV  98.1 fl (81.0-99.0)   05/04/18  08:54    


 


MCH  33.1 pg (27.0-31.0)  H  05/04/18  08:54    


 


MCHC  33.8 g/dL (33.0-37.0)   05/04/18  08:54    


 


RDW  13.2 % (11.5-14.5)   05/04/18  08:54    


 


Plt Count  177 K/uL (130-400)   05/04/18  08:54    


 


MPV  9.0 fl (7.2-11.7)   05/02/18  06:45    


 


Neut % (Auto)  54.0 % (50.0-75.0)   05/02/18  06:45    


 


Lymph % (Auto)  29.7 % (20.0-40.0)   05/02/18  06:45    


 


Mono % (Auto)  13.2 % (0.0-10.0)  H  05/02/18  06:45    


 


Eos % (Auto)  2.1 % (0.0-4.0)   05/02/18  06:45    


 


Baso % (Auto)  1.0 % (0.0-2.0)   05/02/18  06:45    


 


Neut # (Auto)  3.2 K/uL (1.8-7.0)   05/02/18  06:45    


 


Lymph # (Auto)  1.8 K/uL (1.0-4.3)   05/02/18  06:45    


 


Mono # (Auto)  0.8 K/uL (0.0-0.8)   05/02/18  06:45    


 


Eos # (Auto)  0.1 K/uL (0.0-0.7)   05/02/18  06:45    


 


Baso # (Auto)  0.1 K/uL (0.0-0.2)   05/02/18  06:45    


 


Sodium  136 mmol/l (132-148)   05/04/18  08:54    


 


Potassium  3.8 MMOL/L (3.6-5.0)   05/04/18  08:54    


 


Chloride  99 mmol/L ()   05/04/18  08:54    


 


Carbon Dioxide  27 mmol/L (22-30)   05/04/18  08:54    


 


Anion Gap  14  (10-20)   05/04/18  08:54    


 


BUN  11 mg/dl (7-17)   05/04/18  08:54    


 


Creatinine  0.7 mg/dl (0.7-1.2)   05/04/18  08:54    


 


Est GFR ( Amer)  > 60   05/04/18  08:54    


 


Est GFR (Non-Af Amer)  > 60   05/04/18  08:54    


 


Random Glucose  94 mg/dL ()   05/04/18  08:54    


 


Calcium  7.9 mg/dL (8.4-10.2)  L  05/04/18  08:54    


 


25-OH Vitamin D Total  < 12.8 NG/ML (30.0-100.0)  L  05/04/18  05:29    


 


Blood Type  O POSITIVE   05/02/18  06:45    


 


Blood Type Confirm  O POSITIVE   05/02/18  07:25    


 


Antibody Screen  Negative   05/02/18  06:45    


 


Crossmatch  See Detail   05/02/18  06:45    


 


BBK History Checked  No verified bt   05/02/18  06:45    














- Hospital Course


Hospital Course: 








53 y/o lady with hx of Degen Joint and Disc Disease , came in for scheduled 

Right THR.  Pt has Severe Primary OA of the right hip , and failed outpatient 

conservative treatment for OA. Transferred to TCU for further rehab. 





(1) Primary osteoarthritis of right hip s/p THR


Status: Acute   


Severe Right hip pain , requiring daily pain meds, pain on ROM, pain on 

ambulating worsening this past year.


Post op Day 2 Right THR


Ortho: DR Martell


Pain mgt- d/c PCA - now on Oxycontin and Percocet


DVT proph post op


PT/OT consult








(2) Morbid obesity with BMI of 40.0-44.9, adult


Status: Chronic   





(3) UTI (urinary tract infection), treated


Status: Resolved   


 completed 1 wk  antibiotic treatment ( cipro) for UTI








(4) Mild acute blood loss anemia


- started venofer yesterday








DVT prophylaxis


Status: Acute   


Lovenox 








Discharge Exam





- Head Exam


Head Exam: ATRAUMATIC, NORMOCEPHALIC





- Eye Exam


Eye Exam: EOMI, Normal appearance, PERRL


Pupil Exam: NORMAL ACCOMODATION





- ENT Exam


ENT Exam: Mucous Membranes Moist, Normal Oropharynx





- Respiratory Exam


Respiratory Exam: Clear to PA & Lateral, NORMAL BREATHING PATTERN





- Cardiovascular Exam


Cardiovascular Exam: RRR, +S1, +S2





- GI/Abdominal Exam


GI & Abdominal Exam: Normal Bowel Sounds, Unremarkable





- Extremities Exam


Extremities exam: normal capillary refill, pedal pulses present





- Back Exam


Back exam: absent: CVA tenderness (L), CVA tenderness (R)





- Neurological Exam


Neurological exam: Alert, Reflexes Normal





- Psychiatric Exam


Psychiatric exam: Normal Affect, Normal Mood





- Skin


Skin Exam: Dry, Warm





Discharge Plan





- Follow Up Plan


Condition: GOOD


Disposition: HOME/ ROUTINE


Referrals: 


Kingsley Martell III, MD [Primary Care Provider] -

## 2018-05-05 RX ADMIN — Medication PRN MG: at 03:47

## 2018-05-05 RX ADMIN — ENOXAPARIN SODIUM SCH MG: 40 INJECTION SUBCUTANEOUS at 09:02

## 2018-05-05 RX ADMIN — Medication PRN MG: at 10:48

## 2018-05-05 RX ADMIN — Medication PRN MG: at 23:55

## 2018-05-05 RX ADMIN — Medication PRN MG: at 18:07

## 2018-05-05 NOTE — CP.PCM.CON
History of Present Illness





- History of Present Illness


History of Present Illness: 





ID 53 yo female(high BMI)-now s/p R THR





CC: pt markedly imporoved from preop status;minimal post op discomfort





Past Patient History





- Infectious Disease


Hx of Infectious Diseases: None





- Tetanus Immunizations


Tetanus Immunization: Unknown





- Past Medical History & Family History


Past Medical History?: Yes





- Past Social History


Smoking Status: Former Smoker





- CARDIAC


Hx Cardiac Disorders: No





- PULMONARY


Hx Respiratory Disorders: No





- NEUROLOGICAL


Hx Neurological Disorder: No





- HEENT


Hx HEENT Problems: No





- RENAL


Hx Chronic Kidney Disease: No





- ENDOCRINE/METABOLIC


Hx Endocrine Disorders: No





- HEMATOLOGICAL/ONCOLOGICAL


Hx Blood Disorders: No


Hx Blood Transfusion Reaction: No





- INTEGUMENTARY


Hx Dermatological Problems: No





- MUSCULOSKELETAL/RHEUMATOLOGICAL


Hx Musculoskeletal Disorders: No


Hx Arthritis: Yes


Hx Degenerative Joint Disease: Yes


Hx Falls: No


Hx Herniated Disk: Yes (4)


Hx Osteoarthritis: Yes


Hx Spinal Stenosis: Yes





- GASTROINTESTINAL


Hx Gastrointestinal Disorders: Yes


Hx Gastritis: Yes





- GENITOURINARY/GYNECOLOGICAL


Hx Genitourinary Disorders: No





- PSYCHIATRIC


Hx Psychophysiologic Disorder: No





- SURGICAL HISTORY


Hx Surgeries: Yes


Other/Comment: epidural,ectopic pregnancy.  Rhip inj 3/2018





- ANESTHESIA


Hx Anesthesia: Yes


Hx Anesthesia Reactions: No


Hx Malignant Hyperthermia: No





Meds


Allergies/Adverse Reactions: 


 Allergies











Allergy/AdvReac Type Severity Reaction Status Date / Time


 


No Known Allergies Allergy   Verified 05/04/18 17:59














- Medications


Medications: 


 Current Medications





Acetaminophen (Tylenol 325mg Tab)  650 mg PO Q4 PRN


   PRN Reason: Fever >100.4 F


Artificial Tears (Artificial Tears)  2 drop OU Q4 PRN


   PRN Reason: Dry eyes


Docusate Sodium (Colace)  100 mg PO BID [facility]


   Last Admin: 05/05/18 09:01 Dose:  100 mg


Enoxaparin Sodium (Lovenox)  40 mg SC DAILY [facility]


   PRN Reason: Protocol


   Last Admin: 05/05/18 09:02 Dose:  40 mg


Oxycodone HCl (Oxycodone Immediate Release Tab)  5 mg PO Q4 PRN


   PRN Reason: Pain, moderate (4-7)


   Last Admin: 05/05/18 03:47 Dose:  5 mg











Physical Exam





- Additional Findings


Additional findings: 





Systemic exam:





Heent- wnl


pt with BMI>43





remainder of systemic- wnl





Muscuolskeletal


stance/gait- defrred





ROM R hip with decreased tenderness R hip





 wouind benign





Results





- Vital Signs


Recent Vital Signs: 


 Last Vital Signs











Temp  98.1 F   05/05/18 08:13


 


Pulse  99 H  05/05/18 08:13


 


Resp  20   05/05/18 08:13


 


BP  129/64   05/05/18 08:13


 


Pulse Ox  85 L  05/05/18 08:13














Assessment & Plan





- Assessment and Plan (Free Text)


Assessment: 





A- s/p R THR





P- excellent progress;orthopedically stable





physion- strict toe touch weight bearing with walker

## 2018-05-05 NOTE — CP.PCM.HP
History of Present Illness





- History of Present Illness


History of Present Illness: 





51 yo female with no significant PMH aside fro OA had right THR on 5/2/2018 

after failing conservative management. Patient did well post op and was 

transferred to TCU for continuation of rehabilitation.

















Present on Admission





- Present on Admission


Any Indicators Present on Admission: No


History of DVT/PE: No


History of Uncontrolled Diabetes: No


Urinary Catheter: No


Decubitus Ulcer Present: No





Review of Systems





- Review of Systems


All systems: reviewed and no additional remarkable complaints except (aside 

from those mentioned above, 12 point system review were negative by me)





Past Patient History





- Infectious Disease


Hx of Infectious Diseases: None





- Tetanus Immunizations


Tetanus Immunization: Unknown





- Past Medical History & Family History


Past Medical History?: Yes





- Past Social History


Smoking Status: Former Smoker


Chewing Tobacco Use: No


Cigar Use: No


Alcohol: None


Drugs: Denies


Home Situation {Lives}: With Family





- CARDIAC


Hx Cardiac Disorders: No





- PULMONARY


Hx Respiratory Disorders: No





- NEUROLOGICAL


Hx Neurological Disorder: No





- HEENT


Hx HEENT Problems: No





- RENAL


Hx Chronic Kidney Disease: No





- ENDOCRINE/METABOLIC


Hx Endocrine Disorders: No





- HEMATOLOGICAL/ONCOLOGICAL


Hx Blood Disorders: No


Hx Blood Transfusion Reaction: No





- INTEGUMENTARY


Hx Dermatological Problems: No





- MUSCULOSKELETAL/RHEUMATOLOGICAL


Hx Musculoskeletal Disorders: No


Hx Arthritis: Yes


Hx Degenerative Joint Disease: Yes


Hx Falls: No


Hx Herniated Disk: Yes (4)


Hx Osteoarthritis: Yes


Hx Spinal Stenosis: Yes





- GASTROINTESTINAL


Hx Gastrointestinal Disorders: Yes


Hx Gastritis: Yes





- GENITOURINARY/GYNECOLOGICAL


Hx Genitourinary Disorders: No





- PSYCHIATRIC


Hx Psychophysiologic Disorder: No





- SURGICAL HISTORY


Hx Surgeries: Yes


Other/Comment: epidural,ectopic pregnancy.  Rhip inj 3/2018





- ANESTHESIA


Hx Anesthesia: Yes


Hx Anesthesia Reactions: No


Hx Malignant Hyperthermia: No





Meds


Allergies/Adverse Reactions: 


 Allergies











Allergy/AdvReac Type Severity Reaction Status Date / Time


 


No Known Allergies Allergy   Verified 05/04/18 17:59














Physical Exam





- Constitutional


Appears: No Acute Distress





- Head Exam


Head Exam: ATRAUMATIC





- Eye Exam


Eye Exam: absent: Scleral icterus





- ENT Exam


ENT Exam: Mucous Membranes Moist





- Neck Exam


Neck exam: Negative for: Meningismus





- Respiratory Exam


Respiratory Exam: absent: Rales, Rhonchi, Wheezes, Respiratory Distress





- Cardiovascular Exam


Cardiovascular Exam: REGULAR RHYTHM, +S1, +S2





- GI/Abdominal Exam


GI & Abdominal Exam: Soft.  absent: Tenderness





- Rectal Exam


Rectal Exam: Deferred





- Extremities Exam


Extremities exam: Negative for: full ROM (limitation of ROM on right hip)





- Back Exam


Back exam: NORMAL INSPECTION





- Neurological Exam


Neurological exam: Alert, Oriented x3





- Psychiatric Exam


Psychiatric exam: Normal Affect





- Skin


Skin Exam: Dry, Intact





Results





- Vital Signs


Recent Vital Signs: 





 Last Vital Signs











Temp  98.1 F   05/05/18 08:13


 


Pulse  99 H  05/05/18 08:13


 


Resp  20   05/05/18 08:13


 


BP  129/64   05/05/18 08:13


 


Pulse Ox  85 L  05/05/18 08:13














Assessment & Plan





- Assessment and Plan (Free Text)


Assessment: 





51 yo female with no significant PMH aside fro OA had right THR on 5/2/2018 

after failing conservative management. Patient did well post op and was 

transferred to TCU for continuation of rehabilitation.





1. Primary Osteoarthritis of Right Hip s/p THR


pain tolerable and relieved with Percocet


Post op Day # 3


continue ortho consult with Dr Martell


continue PT/OT








2. Morbid obesity with BMI of 40.0-44.9, adult








3. UTI (urinary tract infection)


Resolved   


completed 1 week antibiotic treatment (Cipro) for UTI








4. DVT prophylaxis


Lovenox

## 2018-05-06 RX ADMIN — Medication PRN MG: at 22:06

## 2018-05-06 RX ADMIN — ENOXAPARIN SODIUM SCH MG: 40 INJECTION SUBCUTANEOUS at 08:44

## 2018-05-06 NOTE — CP.PCM.CON
History of Present Illness





- History of Present Illness


History of Present Illness: 





52 year old female admitted to TCU after undergoing surgery for right total hip 

replacement by Dr Martell with history of  advanced OA





Review of Systems





- Musculoskeletal


Musculoskeletal: Abnormal Gait, Limited Range of Motion, Muscle Weakness





Past Patient History





- Infectious Disease


Hx of Infectious Diseases: None





- Tetanus Immunizations


Tetanus Immunization: Unknown





- Past Medical History & Family History


Past Medical History?: Yes





- Past Social History


Smoking Status: Former Smoker


Chewing Tobacco Use: No


Cigar Use: No


Alcohol: None


Drugs: Denies


Home Situation {Lives}: With Family





- CARDIAC


Hx Cardiac Disorders: No





- PULMONARY


Hx Respiratory Disorders: No





- NEUROLOGICAL


Hx Neurological Disorder: No





- HEENT


Hx HEENT Problems: No





- RENAL


Hx Chronic Kidney Disease: No





- ENDOCRINE/METABOLIC


Hx Endocrine Disorders: No





- HEMATOLOGICAL/ONCOLOGICAL


Hx Blood Disorders: No


Hx Blood Transfusion Reaction: No





- INTEGUMENTARY


Hx Dermatological Problems: No





- MUSCULOSKELETAL/RHEUMATOLOGICAL


Hx Musculoskeletal Disorders: No


Hx Arthritis: Yes


Hx Degenerative Joint Disease: Yes


Hx Falls: No


Hx Herniated Disk: Yes (4)


Hx Osteoarthritis: Yes


Hx Spinal Stenosis: Yes





- GASTROINTESTINAL


Hx Gastrointestinal Disorders: Yes


Hx Gastritis: Yes





- GENITOURINARY/GYNECOLOGICAL


Hx Genitourinary Disorders: No





- PSYCHIATRIC


Hx Psychophysiologic Disorder: No





- SURGICAL HISTORY


Hx Surgeries: Yes


Other/Comment: epidural,ectopic pregnancy.  Rhip inj 3/2018





- ANESTHESIA


Hx Anesthesia: Yes


Hx Anesthesia Reactions: No


Hx Malignant Hyperthermia: No





Meds


Allergies/Adverse Reactions: 


 Allergies











Allergy/AdvReac Type Severity Reaction Status Date / Time


 


No Known Allergies Allergy   Verified 05/04/18 17:59














- Medications


Medications: 


 Current Medications





Acetaminophen (Tylenol 325mg Tab)  650 mg PO Q4 PRN


   PRN Reason: Fever >100.4 F


Artificial Tears (Artificial Tears)  2 drop OU Q4 PRN


   PRN Reason: Dry eyes


Docusate Sodium (Colace)  100 mg PO BID [facility]


   Last Admin: 05/06/18 08:42 Dose:  100 mg


Enoxaparin Sodium (Lovenox)  40 mg SC DAILY [facility]


   PRN Reason: Protocol


   Last Admin: 05/06/18 08:44 Dose:  40 mg


Lactulose (Enulose)  20 gm PO DAILY PRN


   PRN Reason: Constipation


Ondansetron HCl (Zofran Inj)  4 mg IVP Q4 PRN


   PRN Reason: Nausea/Vomiting


   Last Admin: 05/05/18 18:27 Dose:  4 mg


Oxycodone HCl (Oxycodone Immediate Release Tab)  5 mg PO Q4 PRN


   PRN Reason: Pain, moderate (4-7)


   Last Admin: 05/05/18 23:55 Dose:  5 mg











Physical Exam





- Head Exam


Head Exam: ATRAUMATIC, NORMAL INSPECTION, NORMOCEPHALIC





- Eye Exam


Eye Exam: EOMI, Normal appearance


Pupil Exam: NORMAL ACCOMODATION, PERRL





- ENT Exam


ENT Exam: Mucous Membranes Moist, Normal Exam





- Neck Exam


Neck exam: Positive for: Normal Inspection





- Respiratory Exam


Respiratory Exam: Clear to Auscultation Bilateral





- Cardiovascular Exam


Cardiovascular Exam: REGULAR RHYTHM





- GI/Abdominal Exam


GI & Abdominal Exam: Normal Bowel Sounds





- Rectal Exam


Rectal Exam: NORMAL INSPECTION





-  Exam


External exam: NORMAL EXTERNAL EXAM





- Extremities Exam


Extremities exam: Positive for: normal inspection


Additional comments: 





right leg weakness





- Back Exam


Back exam: NORMAL INSPECTION





- Neurological Exam


Neurological exam: Alert, CN II-XII Intact





Results





- Vital Signs


Recent Vital Signs: 


 Last Vital Signs











Temp  98.6 F   05/06/18 10:00


 


Pulse  98 H  05/06/18 10:00


 


Resp  18   05/06/18 10:00


 


BP  132/77   05/06/18 10:00


 


Pulse Ox  97   05/06/18 10:00














Assessment & Plan


(1) DVT prophylaxis


Status: Acute   





(2) Primary osteoarthritis of right hip


Status: Acute   





(3) Morbid obesity with BMI of 40.0-44.9, adult


Status: Chronic   





(4) UTI (urinary tract infection)


Status: Resolved   





(5) Aftercare following right hip joint replacement surgery


Assessment and Plan: 


plan for physical, occupational therapy for range of motion, strengthening, 

transfers and gait training.  Monitor skin and pain management


Status: Acute

## 2018-05-07 LAB
ERYTHROCYTE [DISTWIDTH] IN BLOOD BY AUTOMATED COUNT: 13.6 % (ref 11.5–14.5)
HGB BLD-MCNC: 9 G/DL (ref 12–16)
MCH RBC QN AUTO: 33.4 PG (ref 27–31)
MCHC RBC AUTO-ENTMCNC: 34.1 G/DL (ref 33–37)
MCV RBC AUTO: 98 FL (ref 81–99)
PLATELET # BLD: 266 K/UL (ref 130–400)
RBC # BLD AUTO: 2.68 MIL/UL (ref 3.8–5.2)
WBC # BLD AUTO: 8.1 K/UL (ref 4.8–10.8)

## 2018-05-07 RX ADMIN — Medication PRN MG: at 08:02

## 2018-05-07 RX ADMIN — Medication PRN MG: at 12:28

## 2018-05-07 RX ADMIN — Medication PRN MG: at 21:57

## 2018-05-07 RX ADMIN — ENOXAPARIN SODIUM SCH MG: 40 INJECTION SUBCUTANEOUS at 08:03

## 2018-05-07 RX ADMIN — VITAMIN D, TAB 1000IU (100/BT) SCH INTLU: 25 TAB at 10:26

## 2018-05-07 NOTE — CP.PCM.PN
Subjective





- Date & Time of Evaluation


Date of Evaluation: 05/07/18


Time of Evaluation: 08:00





- Subjective


Subjective: 





Patient seen and examined OOB to chair comfortable.  Patient is tolerating PT 

well ambulating with walker.  No new complaints.





Objective





- Vital Signs/Intake and Output


Vital Signs (last 24 hours): 


 











Temp Pulse Resp BP Pulse Ox


 


 99.0 F   90   18   115/51 L  97 


 


 05/07/18 07:51  05/07/18 07:51  05/07/18 07:51  05/07/18 07:51  05/07/18 07:51











- Medications


Medications: 


 Current Medications





Acetaminophen (Tylenol 325mg Tab)  650 mg PO Q4 PRN


   PRN Reason: Fever >100.4 F


Artificial Tears (Artificial Tears)  2 drop OU Q4 PRN


   PRN Reason: Dry eyes


Docusate Sodium (Colace)  100 mg PO BID [facility]


   Last Admin: 05/07/18 08:03 Dose:  100 mg


Enoxaparin Sodium (Lovenox)  40 mg SC DAILY [facility]


   PRN Reason: Protocol


   Last Admin: 05/07/18 08:03 Dose:  40 mg


Lactulose (Enulose)  20 gm PO DAILY PRN


   PRN Reason: Constipation


   Last Admin: 05/06/18 11:03 Dose:  20 gm


Ondansetron HCl (Zofran Inj)  4 mg IVP Q4 PRN


   PRN Reason: Nausea/Vomiting


   Last Admin: 05/05/18 18:27 Dose:  4 mg


Oxycodone HCl (Oxycodone Immediate Release Tab)  5 mg PO Q4 PRN


   PRN Reason: Pain, moderate (4-7)


   Last Admin: 05/07/18 08:02 Dose:  5 mg











- Extremities Exam


Additional comments: 


R hip: ISAI dressing with 30% serous saturation. mild swelling and tenderness. 

Dressing taken down revealing wound intact with staples, no drainage


sensation intact SP/DP/TN


motor intact EHL/FHL/TA/G/Q/HS


pedal pulses intact


comp soft NT








Assessment and Plan


(1) Primary osteoarthritis of right hip


Assessment & Plan: 


Patient is POD# 5 s/p R BETTY


-ISAI dressings changed


-pain control


-DVT ppx


-PT/OT cont TTWB


-orthopedically stable


-above d/w Dr. Martell in agreement


Status: Acute

## 2018-05-08 RX ADMIN — VITAMIN D, TAB 1000IU (100/BT) SCH INTLU: 25 TAB at 08:04

## 2018-05-08 RX ADMIN — Medication PRN MG: at 21:29

## 2018-05-08 RX ADMIN — Medication PRN MG: at 02:17

## 2018-05-08 RX ADMIN — ENOXAPARIN SODIUM SCH MG: 40 INJECTION SUBCUTANEOUS at 08:04

## 2018-05-08 RX ADMIN — Medication PRN MG: at 08:03

## 2018-05-08 NOTE — CP.PCM.PN
Subjective





- Date & Time of Evaluation


Date of Evaluation: 05/08/18


Time of Evaluation: 13:00





- Subjective


Subjective: 





Patient was seen and examined during her physical therapy. She has no new 

complaints. States that she continues to have a burning pain at the location of 

her right hip but it is well controlled with pain medications. Tolerating 

therapies well. 





Objective





- Vital Signs/Intake and Output


Vital Signs (last 24 hours): 


 











Temp Pulse Resp BP Pulse Ox


 


 98.2 F   79   20   110/67   94 L


 


 05/08/18 08:19  05/08/18 08:19  05/08/18 08:19  05/08/18 08:19  05/08/18 08:19











- Medications


Medications: 


 Current Medications





Acetaminophen (Tylenol 325mg Tab)  650 mg PO Q4 PRN


   PRN Reason: Pain, Mild (1-3)


   Last Admin: 05/08/18 12:00 Dose:  650 mg


Artificial Tears (Artificial Tears)  2 drop OU Q4 PRN


   PRN Reason: Dry eyes


Cholecalciferol (Vitamin D)  2,000 intlu PO DAILY [facility]


   Last Admin: 05/08/18 08:04 Dose:  2,000 intlu


Docusate Sodium (Colace)  100 mg PO BID [facility]


   Last Admin: 05/08/18 08:04 Dose:  100 mg


Enoxaparin Sodium (Lovenox)  40 mg SC DAILY [facility]


   PRN Reason: Protocol


   Last Admin: 05/08/18 08:04 Dose:  40 mg


Lactulose (Enulose)  20 gm PO DAILY PRN


   PRN Reason: Constipation


   Last Admin: 05/06/18 11:03 Dose:  20 gm


Ondansetron HCl (Zofran Odt)  4 mg PO Q8H PRN


   PRN Reason: Nausea/Vomiting


Oxycodone HCl (Oxycodone Immediate Release Tab)  5 mg PO Q4 PRN


   PRN Reason: Pain, moderate (4-7)


   Last Admin: 05/08/18 08:03 Dose:  5 mg











- Labs


Labs: 


 





 05/07/18 08:45 











- Additional Findings


Additional findings: 





Physical exam:





Constitutional- cooperative, awake, alert


Head- NCAT, PERRL


Eye- PERRL, EOMI


ENT- normal exam, MMM.


Neck- normal inspection, supple, no JVD


Respiratory- CTAB, no wheezes rales rhonchi


Cardiovascular- RRR, +S1, +S2 no MRG


GI/Abdominal- normal bowel sounds, soft, no mass, no hsm


Skin- warm, dry


Extremities Exam- normal capillary refill. + Decreased ROM right hip


Neurological Exam- alert, awake, oriented


Psych- normal mood, normal affect





Assessment and Plan





- Assessment and Plan (Free Text)


Plan: 





53 yo female with no significant PMH aside fro OA had right THR on 5/2/2018 

after failing conservative management. Patient did well post op and was 

transferred to TCU for continuation of rehabilitation.











1. Primary Osteoarthritis of Right Hip s/p THR


pain tolerable and relieved with Percocet. Ambulating with walker


Post op Day # 6


continue ortho consult with Dr Martell


continue PT/OT








2. Morbid obesity with BMI of 40.0-44.9, adult


- chronic





3. UTI (urinary tract infection)


Resolved   


completed 1 week antibiotic treatment (Cipro) for UTI








4. DVT prophylaxis


Lovenox

## 2018-05-08 NOTE — CP.PCM.PN
Subjective





- Date & Time of Evaluation


Date of Evaluation: 05/08/18


Time of Evaluation: 12:00





- Subjective


Subjective: 





no acute complaints of leg discomfort





Objective





- Vital Signs/Intake and Output


Vital Signs (last 24 hours): 


 











Temp Pulse Resp BP Pulse Ox


 


 98.2 F   79   20   110/67   94 L


 


 05/08/18 08:19  05/08/18 08:19  05/08/18 08:19  05/08/18 08:19  05/08/18 08:19











- Medications


Medications: 


 Current Medications





Acetaminophen (Tylenol 325mg Tab)  650 mg PO Q4 PRN


   PRN Reason: Pain, Mild (1-3)


   Last Admin: 05/08/18 12:00 Dose:  650 mg


Artificial Tears (Artificial Tears)  2 drop OU Q4 PRN


   PRN Reason: Dry eyes


Cholecalciferol (Vitamin D)  2,000 intlu PO DAILY [facility]


   Last Admin: 05/08/18 08:04 Dose:  2,000 intlu


Docusate Sodium (Colace)  100 mg PO BID [facility]


   Last Admin: 05/08/18 08:04 Dose:  100 mg


Enoxaparin Sodium (Lovenox)  40 mg SC DAILY [facility]


   PRN Reason: Protocol


   Last Admin: 05/08/18 08:04 Dose:  40 mg


Lactulose (Enulose)  20 gm PO DAILY PRN


   PRN Reason: Constipation


   Last Admin: 05/06/18 11:03 Dose:  20 gm


Ondansetron HCl (Zofran Odt)  4 mg PO Q8H PRN


   PRN Reason: Nausea/Vomiting


Oxycodone HCl (Oxycodone Immediate Release Tab)  5 mg PO Q4 PRN


   PRN Reason: Pain, moderate (4-7)


   Last Admin: 05/08/18 08:03 Dose:  5 mg











- Labs


Labs: 


 





 05/07/18 08:45 











- Head Exam


Head Exam: ATRAUMATIC, NORMAL INSPECTION, NORMOCEPHALIC





- Eye Exam


Eye Exam: EOMI, Normal appearance, PERRL


Pupil Exam: NORMAL ACCOMODATION





- ENT Exam


ENT Exam: Mucous Membranes Moist, Normal Exam





- Neck Exam


Neck Exam: Normal Inspection





- Respiratory Exam


Respiratory Exam: Clear to Ausculation Bilateral, NORMAL BREATHING PATTERN





- Cardiovascular Exam


Cardiovascular Exam: REGULAR RHYTHM





- GI/Abdominal Exam


GI & Abdominal Exam: Soft, Normal Bowel Sounds





- Rectal Exam


Rectal Exam: NORMAL INSPECTION





-  Exam


External exam: NORMAL EXTERNAL EXAM





- Extremities Exam


Extremities Exam: Full ROM, Normal Capillary Refill, Normal Inspection





- Back Exam


Back Exam: NORMAL INSPECTION





- Neurological Exam


Neurological Exam: Alert, Awake, CN II-XII Intact


Neuro motor strength exam: Left Upper Extremity: 4, Right Upper Extremity: 4, 

Left Lower Extremity: 4, Right Lower Extremity: 3





- Psychiatric Exam


Psychiatric exam: Normal Affect, Normal Mood





- Skin


Skin Exam: Dry, Intact





Assessment and Plan


(1) DVT prophylaxis


Status: Acute   





(2) Primary osteoarthritis of right hip


Status: Acute   





(3) Morbid obesity with BMI of 40.0-44.9, adult


Status: Chronic   





(4) Aftercare following right hip joint replacement surgery


Assessment & Plan: 


plan to continue with physical, occuaptional therapy program


Status: Acute

## 2018-05-09 RX ADMIN — VITAMIN D, TAB 1000IU (100/BT) SCH INTLU: 25 TAB at 08:04

## 2018-05-09 RX ADMIN — Medication PRN MG: at 23:56

## 2018-05-09 RX ADMIN — ENOXAPARIN SODIUM SCH MG: 40 INJECTION SUBCUTANEOUS at 08:04

## 2018-05-09 RX ADMIN — Medication PRN MG: at 01:31

## 2018-05-09 RX ADMIN — Medication PRN MG: at 12:07

## 2018-05-09 RX ADMIN — Medication PRN MG: at 18:22

## 2018-05-09 NOTE — PN
DATE:  05/09/2018



PHYSIATRY PROGRESS NOTE



SUBJECTIVE:  The patient is a 52-year-old female that underwent right hip

surgery secondary to severe osteoarthritis that was not controlled with

conservative treatment.  The patient is toe-touch weightbearing.  Continue

with pain management, and dressing changes.



PHYSICAL EXAMINATION:

VITAL SIGNS:  Stable.

NECK:  Supple.

CHEST:  Symmetrical.

HEART:  S1 and S2.

GASTROINTESTINAL:  Abdominal area benign.

EXTREMITIES:  No clubbing, cyanosis, or edema.



IMPRESSION AND PLAN:  Right total hip arthroplasty, status post

osteoarthritis of the right hip.  Continue with physical and occupational

therapy program.  Continue with weight precautions of toe-touch

weightbearing.  Continue with dressing changing and pain management.







__________________________________________

Manuel Lara MD





DD:  05/09/2018 13:32:51

DT:  05/09/2018 14:35:00

Job # [id number]

## 2018-05-10 VITALS — HEART RATE: 85 BPM

## 2018-05-10 VITALS — RESPIRATION RATE: 20 BRPM

## 2018-05-10 RX ADMIN — VITAMIN D, TAB 1000IU (100/BT) SCH INTLU: 25 TAB at 08:00

## 2018-05-10 RX ADMIN — ENOXAPARIN SODIUM SCH MG: 40 INJECTION SUBCUTANEOUS at 08:00

## 2018-05-10 RX ADMIN — Medication PRN MG: at 21:40

## 2018-05-10 NOTE — CP.PCM.PN
Subjective





- Date & Time of Evaluation


Date of Evaluation: 05/10/18


Time of Evaluation: 09:00





- Subjective


Subjective: 


Patient seen and examined OOB to chair. Pain well controlled. Tolerating PT 

well.  No new complaints.








Objective





- Vital Signs/Intake and Output


Vital Signs (last 24 hours): 


 











Temp Pulse Resp BP Pulse Ox


 


 98.2 F   86   18   144/78   99 


 


 05/10/18 07:50  05/10/18 07:50  05/10/18 07:50  05/10/18 07:50  05/10/18 07:50











- Medications


Medications: 


 Current Medications





Acetaminophen (Tylenol 325mg Tab)  650 mg PO Q4 PRN


   PRN Reason: Pain, Mild (1-3)


   Last Admin: 05/10/18 08:01 Dose:  650 mg


Artificial Tears (Artificial Tears)  2 drop OU Q4 PRN


   PRN Reason: Dry eyes


Cholecalciferol (Vitamin D)  2,000 intlu PO DAILY [facility]


   Last Admin: 05/10/18 08:00 Dose:  2,000 intlu


Docusate Sodium (Colace)  100 mg PO BID [facility]


   Last Admin: 05/10/18 08:00 Dose:  100 mg


Enoxaparin Sodium (Lovenox)  40 mg SC DAILY [facility]


   PRN Reason: Protocol


   Last Admin: 05/10/18 08:00 Dose:  40 mg


Lactulose (Enulose)  20 gm PO DAILY PRN


   PRN Reason: Constipation


   Last Admin: 05/06/18 11:03 Dose:  20 gm


Ondansetron HCl (Zofran Odt)  4 mg PO Q8H PRN


   PRN Reason: Nausea/Vomiting


Oxycodone HCl (Oxycodone Immediate Release Tab)  5 mg PO Q4 PRN


   PRN Reason: Pain, moderate (4-7)


   Last Admin: 05/09/18 23:56 Dose:  5 mg











- Labs


Labs: 


 





 05/07/18 08:45 











- Extremities Exam


Additional comments: 


R hip: ISAI dressing CDI. mild swelling and tenderness. Dressing taken down 

revealing wound intact with staples, no drainage


sensation intact SP/DP/TN


motor intact EHL/FHL/TA/G/Q/HS


pedal pulses intact


comp soft NT

















Assessment and Plan


(1) Primary osteoarthritis of right hip


Assessment & Plan: 


Patient is POD# 8 s/p R BETTY


-ISAI dressings removed, dry dressing applied 


-pain control


-DVT ppx


-PT/OT foot flat 10% WB


-orthopedically stable for discharge


-f/u with Dr. Martell in office in 7-10 days


-above d/w Dr. Martell in agreement





Status: Acute

## 2018-05-11 VITALS — OXYGEN SATURATION: 100 % | SYSTOLIC BLOOD PRESSURE: 131 MMHG | TEMPERATURE: 98.1 F | DIASTOLIC BLOOD PRESSURE: 68 MMHG

## 2018-05-11 RX ADMIN — ENOXAPARIN SODIUM SCH MG: 40 INJECTION SUBCUTANEOUS at 08:52

## 2018-05-11 RX ADMIN — VITAMIN D, TAB 1000IU (100/BT) SCH INTLU: 25 TAB at 08:52

## 2018-05-11 RX ADMIN — Medication PRN MG: at 08:56

## 2018-05-11 NOTE — CP.PCM.PN
Subjective





- Date & Time of Evaluation


Date of Evaluation: 05/11/18


Time of Evaluation: 12:30





- Subjective


Subjective: 





no acute complaints at present





Objective





- Vital Signs/Intake and Output


Vital Signs (last 24 hours): 


 











Temp Pulse Resp BP Pulse Ox


 


 98.1 F   85   20   131/68   100 


 


 05/11/18 08:00  05/11/18 08:00  05/11/18 08:00  05/11/18 08:00  05/11/18 08:00











- Medications


Medications: 


 Current Medications





Acetaminophen (Tylenol 325mg Tab)  650 mg PO Q4 PRN


   PRN Reason: Pain, Mild (1-3)


   Last Admin: 05/10/18 17:53 Dose:  650 mg


Artificial Tears (Artificial Tears)  2 drop OU Q4 PRN


   PRN Reason: Dry eyes


Cholecalciferol (Vitamin D)  2,000 intlu PO DAILY Counts include 234 beds at the [facility]


   Last Admin: 05/11/18 08:52 Dose:  2,000 intlu


Docusate Sodium (Colace)  100 mg PO BID Counts include 234 beds at the [facility]


   Last Admin: 05/11/18 08:52 Dose:  100 mg


Enoxaparin Sodium (Lovenox)  40 mg SC DAILY Counts include 234 beds at the [facility]


   PRN Reason: Protocol


   Last Admin: 05/11/18 08:52 Dose:  40 mg


Lactulose (Enulose)  20 gm PO DAILY PRN


   PRN Reason: Constipation


   Last Admin: 05/06/18 11:03 Dose:  20 gm


Ondansetron HCl (Zofran Odt)  4 mg PO Q8H PRN


   PRN Reason: Nausea/Vomiting


Oxycodone HCl (Oxycodone Immediate Release Tab)  5 mg PO Q4 PRN


   PRN Reason: Pain, moderate (4-7)


   Last Admin: 05/11/18 08:56 Dose:  5 mg











- Labs


Labs: 


 





 05/07/18 08:45 











- Head Exam


Head Exam: ATRAUMATIC, NORMAL INSPECTION, NORMOCEPHALIC





- Eye Exam


Eye Exam: EOMI, Normal appearance, PERRL


Pupil Exam: NORMAL ACCOMODATION





- ENT Exam


ENT Exam: Mucous Membranes Moist, Normal Exam





- Neck Exam


Neck Exam: Full ROM, Normal Inspection





- Respiratory Exam


Respiratory Exam: Clear to Ausculation Bilateral, NORMAL BREATHING PATTERN





- Cardiovascular Exam


Cardiovascular Exam: REGULAR RHYTHM





- GI/Abdominal Exam


GI & Abdominal Exam: Soft, Normal Bowel Sounds





- Rectal Exam


Rectal Exam: NORMAL INSPECTION





-  Exam


External exam: NORMAL EXTERNAL EXAM





- Extremities Exam


Extremities Exam: Full ROM, Normal Capillary Refill, Normal Inspection





- Back Exam


Back Exam: NORMAL INSPECTION





- Psychiatric Exam


Psychiatric exam: Normal Affect, Normal Mood





- Skin


Skin Exam: Dry, Intact





Assessment and Plan


(1) DVT prophylaxis


Status: Acute   





(2) Primary osteoarthritis of right hip


Status: Chronic   





(3) Morbid obesity with BMI of 40.0-44.9, adult


Status: Chronic   





(4) Aftercare following right hip joint replacement surgery


Assessment & Plan: 


plan for Pt, ot therapy   for Dc planning


Status: Acute

## 2018-05-11 NOTE — CP.PCM.DIS
Provider





- Provider


Date of Admission: 


05/04/18 17:46





Attending physician: 


Caesar Vargas DO





Primary care physician: 


Kingsley Martell III, MD





Consults: 








05/04/18 18:02


Case Management Referral Routine 


   Comment: 


   Physician Instructions: 


   Reason For Exam: 


   Reason for Referral: Discharge Planning











Time Spent in preparation of Discharge (in minutes): 25





Diagnosis





- Discharge Diagnosis


(1) Primary osteoarthritis of right hip


Status: Chronic   


Comment: Had right THR on 5/2/2018.  did well with rehab.  follow up with Dr Martell.  continue PT as outpatient.  continue pain management as needed   





Hospital Course





- Lab Results


Lab Results: 


 Most Recent Lab Values











WBC  8.1 K/uL (4.8-10.8)   05/07/18  08:45    


 


RBC  2.68 Mil/uL (3.80-5.20)  L  05/07/18  08:45    


 


Hgb  9.0 g/dL (12.0-16.0)  L  05/07/18  08:45    


 


Hct  26.3 % (34.0-47.0)  L  05/07/18  08:45    


 


MCV  98.0 fl (81.0-99.0)   05/07/18  08:45    


 


MCH  33.4 pg (27.0-31.0)  H  05/07/18  08:45    


 


MCHC  34.1 g/dL (33.0-37.0)   05/07/18  08:45    


 


RDW  13.6 % (11.5-14.5)   05/07/18  08:45    


 


Plt Count  266 K/uL (130-400)   05/07/18  08:45    














- Hospital Course


Hospital Course: 





53 yo female with no significant PMH aside from OA had right THR on 5/2/2018 

after failing conservative management. Post op course was uneventful and 

patient was transferred to TCU for rehabilitation. She did well and now is 

ready for discharge.











Discharge Exam





- Head Exam


Head Exam: ATRAUMATIC, NORMAL INSPECTION, NORMOCEPHALIC





- Eye Exam


Eye Exam: absent: Scleral icterus





- ENT Exam


ENT Exam: Mucous Membranes Moist





- Respiratory Exam


Respiratory Exam: absent: Rales, Rhonchi, Wheezes, Respiratory Distress





- Cardiovascular Exam


Cardiovascular Exam: REGULAR RHYTHM, +S1, +S2





- GI/Abdominal Exam


GI & Abdominal Exam: Soft.  absent: Tenderness





- Rectal Exam


Rectal Exam: Deferred





- Neurological Exam


Neurological exam: Alert, Oriented x3





- Psychiatric Exam


Psychiatric exam: Normal Affect





- Skin


Skin Exam: Dry, Intact





Discharge Plan





- Discharge Medications


Prescriptions: 


oxyCODONE [oxyCODONE Immediate Release Tab] 5 mg PO Q4 PRN #20 tab


 PRN Reason: Pain, Moderate (4-7)





- Follow Up Plan


Condition: GOOD


Disposition: HOME/ ROUTINE


Instructions:  Total Hip Replacement, Preventing Falls


Additional Instructions: 


followup with Orthopedic MD Dr. Kingsley Martell, appointment for May 18, 1:00pm.





Referrals: 


Kingsley Martell III, MD [Primary Care Provider] -